# Patient Record
Sex: FEMALE | Race: WHITE | NOT HISPANIC OR LATINO | Employment: UNEMPLOYED | URBAN - METROPOLITAN AREA
[De-identification: names, ages, dates, MRNs, and addresses within clinical notes are randomized per-mention and may not be internally consistent; named-entity substitution may affect disease eponyms.]

---

## 2018-06-26 VITALS
OXYGEN SATURATION: 99 % | BODY MASS INDEX: 26.14 KG/M2 | HEIGHT: 60 IN | HEART RATE: 120 BPM | RESPIRATION RATE: 18 BRPM | WEIGHT: 133.16 LBS | SYSTOLIC BLOOD PRESSURE: 128 MMHG | DIASTOLIC BLOOD PRESSURE: 87 MMHG | TEMPERATURE: 98.4 F

## 2018-06-26 PROCEDURE — 96361 HYDRATE IV INFUSION ADD-ON: CPT

## 2018-06-26 PROCEDURE — 96360 HYDRATION IV INFUSION INIT: CPT

## 2018-06-26 PROCEDURE — 99284 EMERGENCY DEPT VISIT MOD MDM: CPT

## 2018-06-27 ENCOUNTER — HOSPITAL ENCOUNTER (EMERGENCY)
Facility: MEDICAL CENTER | Age: 43
End: 2018-06-27
Attending: EMERGENCY MEDICINE

## 2018-06-27 DIAGNOSIS — R10.2 PELVIC PAIN: ICD-10-CM

## 2018-06-27 LAB
ALBUMIN SERPL BCP-MCNC: 4.1 G/DL (ref 3.2–4.9)
ALBUMIN/GLOB SERPL: 1.2 G/DL
ALP SERPL-CCNC: 79 U/L (ref 30–99)
ALT SERPL-CCNC: 11 U/L (ref 2–50)
ANION GAP SERPL CALC-SCNC: 9 MMOL/L (ref 0–11.9)
APPEARANCE UR: CLEAR
AST SERPL-CCNC: 13 U/L (ref 12–45)
BACTERIA GENITAL QL WET PREP: NORMAL
BASOPHILS # BLD AUTO: 1 % (ref 0–1.8)
BASOPHILS # BLD: 0.08 K/UL (ref 0–0.12)
BILIRUB SERPL-MCNC: 0.4 MG/DL (ref 0.1–1.5)
BILIRUB UR QL STRIP.AUTO: NEGATIVE
BUN SERPL-MCNC: 13 MG/DL (ref 8–22)
C TRACH DNA SPEC QL NAA+PROBE: NEGATIVE
CALCIUM SERPL-MCNC: 8.8 MG/DL (ref 8.5–10.5)
CHLORIDE SERPL-SCNC: 106 MMOL/L (ref 96–112)
CO2 SERPL-SCNC: 24 MMOL/L (ref 20–33)
COLOR UR: YELLOW
CREAT SERPL-MCNC: 0.85 MG/DL (ref 0.5–1.4)
EOSINOPHIL # BLD AUTO: 0.23 K/UL (ref 0–0.51)
EOSINOPHIL NFR BLD: 2.7 % (ref 0–6.9)
ERYTHROCYTE [DISTWIDTH] IN BLOOD BY AUTOMATED COUNT: 46.8 FL (ref 35.9–50)
GLOBULIN SER CALC-MCNC: 3.3 G/DL (ref 1.9–3.5)
GLUCOSE SERPL-MCNC: 110 MG/DL (ref 65–99)
GLUCOSE UR STRIP.AUTO-MCNC: NEGATIVE MG/DL
HCG UR QL: NEGATIVE
HCT VFR BLD AUTO: 45.7 % (ref 37–47)
HGB BLD-MCNC: 15.8 G/DL (ref 12–16)
IMM GRANULOCYTES # BLD AUTO: 0.02 K/UL (ref 0–0.11)
IMM GRANULOCYTES NFR BLD AUTO: 0.2 % (ref 0–0.9)
KETONES UR STRIP.AUTO-MCNC: NEGATIVE MG/DL
LEUKOCYTE ESTERASE UR QL STRIP.AUTO: NEGATIVE
LIPASE SERPL-CCNC: 21 U/L (ref 11–82)
LYMPHOCYTES # BLD AUTO: 4.07 K/UL (ref 1–4.8)
LYMPHOCYTES NFR BLD: 48.6 % (ref 22–41)
MCH RBC QN AUTO: 32.3 PG (ref 27–33)
MCHC RBC AUTO-ENTMCNC: 34.6 G/DL (ref 33.6–35)
MCV RBC AUTO: 93.5 FL (ref 81.4–97.8)
MICRO URNS: NORMAL
MONOCYTES # BLD AUTO: 0.84 K/UL (ref 0–0.85)
MONOCYTES NFR BLD AUTO: 10 % (ref 0–13.4)
N GONORRHOEA DNA SPEC QL NAA+PROBE: NEGATIVE
NEUTROPHILS # BLD AUTO: 3.14 K/UL (ref 2–7.15)
NEUTROPHILS NFR BLD: 37.5 % (ref 44–72)
NITRITE UR QL STRIP.AUTO: NEGATIVE
NRBC # BLD AUTO: 0 K/UL
NRBC BLD-RTO: 0 /100 WBC
PH UR STRIP.AUTO: 5 [PH]
PLATELET # BLD AUTO: 340 K/UL (ref 164–446)
PMV BLD AUTO: 8.6 FL (ref 9–12.9)
POTASSIUM SERPL-SCNC: 3.8 MMOL/L (ref 3.6–5.5)
PROT SERPL-MCNC: 7.4 G/DL (ref 6–8.2)
PROT UR QL STRIP: NEGATIVE MG/DL
RBC # BLD AUTO: 4.89 M/UL (ref 4.2–5.4)
RBC UR QL AUTO: NEGATIVE
SIGNIFICANT IND 70042: NORMAL
SITE SITE: NORMAL
SODIUM SERPL-SCNC: 139 MMOL/L (ref 135–145)
SOURCE SOURCE: NORMAL
SP GR UR REFRACTOMETRY: 1.02
SP GR UR STRIP.AUTO: 1.02
SPECIMEN SOURCE: NORMAL
UROBILINOGEN UR STRIP.AUTO-MCNC: 1 MG/DL
WBC # BLD AUTO: 8.4 K/UL (ref 4.8–10.8)

## 2018-06-27 PROCEDURE — 87591 N.GONORRHOEAE DNA AMP PROB: CPT

## 2018-06-27 PROCEDURE — 85025 COMPLETE CBC W/AUTO DIFF WBC: CPT

## 2018-06-27 PROCEDURE — 83690 ASSAY OF LIPASE: CPT

## 2018-06-27 PROCEDURE — 81025 URINE PREGNANCY TEST: CPT

## 2018-06-27 PROCEDURE — 80053 COMPREHEN METABOLIC PANEL: CPT

## 2018-06-27 PROCEDURE — 700105 HCHG RX REV CODE 258: Performed by: EMERGENCY MEDICINE

## 2018-06-27 PROCEDURE — 87491 CHLMYD TRACH DNA AMP PROBE: CPT

## 2018-06-27 PROCEDURE — 81003 URINALYSIS AUTO W/O SCOPE: CPT

## 2018-06-27 RX ORDER — SODIUM CHLORIDE 9 MG/ML
1000 INJECTION, SOLUTION INTRAVENOUS ONCE
Status: COMPLETED | OUTPATIENT
Start: 2018-06-27 | End: 2018-06-27

## 2018-06-27 RX ADMIN — SODIUM CHLORIDE 1000 ML: 9 INJECTION, SOLUTION INTRAVENOUS at 02:30

## 2018-06-27 NOTE — ED PROVIDER NOTES
ED Provider Note  Chief Complaint:   Abdominal Pain    HPI:  Jose Nunez is a 43 y.o. female who presents with chief complaint of abdominal pain. Pain has been present for the past two weeks, described as intermittent cramping and sharp pain across the lower abdomen and pelvis.  She has noticed associated dyspareunia.  States she has previously had menstrual cramps, however her symptoms today seem more severe.  Additionally, she reports a new sexual partner and is concerned about sexually transmitted infections.  She has no abnormal vaginal bleeding, no abnormal vaginal discharge.  She recently moved to the area and is not currently established with a gynecologist.  She has no associated nausea, no vomiting.  She has not had any recent fevers.  Pain is described as localized across the entire lower abdomen, and is nonlateralizing.  She does have a prior history of appendectomy.  She has taken ibuprofen without alleviation.    On review of systems she has no headaches, no neck or back pain.  She has no chest pain, no shortness of breath.  No extremity pain or swelling.  She has no cough, no congestion, no nasal discharge.  She has no sore throat.  No history of impaired immunity, does use methamphetamine, with recent use in the last 24 hours.    Review of Systems:  See HPI for pertinent positives and negatives. All other systems negative.    Past Medical History:   has a past medical history of Uterine prolapse.    Social History:  Social History     Social History Main Topics   • Smoking status: Current Every Day Smoker     Packs/day: 0.50     Types: Cigarettes   • Smokeless tobacco: Never Used   • Alcohol use No   • Drug use: Yes      Comment: meth occasionally    • Sexual activity: Not on file       Surgical History:   has a past surgical history that includes tubal ligation.    Current Medications:  Home Medications    **Home medications have not yet been reviewed for this encounter**         Allergies:  No  Known Allergies    Physical Exam:  Vital Signs: /87   Pulse (!) 120   Temp 36.9 °C (98.4 °F)   Resp 18   Ht 1.524 m (5')   Wt 60.4 kg (133 lb 2.5 oz)   SpO2 99%   BMI 26.01 kg/m²   Constitutional: Alert, no acute distress  HENT: Moist mucus membranes, normal posterior pharynx, no intraoral lesions  Eyes: Pupils equal and reactive, normal conjunctiva  Neck: Supple, normal range of motion, no stridor  Cardiovascular: Extremities are warm and well perfused, no murmer appreciated, normal cardiac auscultation, tachycardic rate.  Pulmonary: No respiratory distress, normal work of breathing, no accessory muscule usage, breath sounds clear and equal bilaterally  Abdomen: Soft, non-distended, non-tender to palpation, no peritoneal signs, no localizable tenderness to palpation across the lower abdomen.  : Normal external genitalia, cervix normal appearing, scant discharge present consistent with physiologic, no abnormal masses or lesions, no blood in the vaginal vault, no cervical motion tenderness.  Skin: Warm, dry, no rashes or lesions  Musculoskeletal: Normal range of motion in all extremities, no swelling or deformity noted  Neurologic: Alert, oriented, normal speech, normal motor function  Psychiatric: Normal and appropriate mood and affect    No prior medical records available for review.    Labs:  Labs Reviewed   CBC WITH DIFFERENTIAL - Abnormal; Notable for the following:        Result Value    MPV 8.6 (*)     Neutrophils-Polys 37.50 (*)     Lymphocytes 48.60 (*)     All other components within normal limits   COMP METABOLIC PANEL - Abnormal; Notable for the following:     Glucose 110 (*)     All other components within normal limits   LIPASE   URINALYSIS,CULTURE IF INDICATED   HCG QUALITATIVE UR   REFRACTOMETER SG   ESTIMATED GFR   WET PREP   CHLAMYDIA/GC PCR URINE OR SWAB       Radiology:  No orders to display        Differential diagnosis:  Pregnancy, ectopic pregnancy, menstrual cramps, ovarian  cyst, intra-abdominal infection including pyelonephritis, cystitis, tubo-ovarian abscess    ED Medications Administered:  Medications   NS infusion 1,000 mL (0 mL Intravenous Stopped 6/27/18 4223)       MDM:  History and physical exam as documented above.  On arrival to the emergency department patient is afebrile, she has no hypotension, she does have a tachycardic heart rate.  Uncertain as to the cause, this may be due to recent methamphetamine use is reported by the patient.    On laboratory evaluation she has no electrolyte abnormalities.  Lipase is within normal limits, no evidence of pancreatitis.  Urinalysis is negative for evidence of infection, negative for evidence of blood.  White blood count is within normal limits, again was concerning for infection.  Hemoglobin within normal limits.  HCG is negative ruling out pregnancy and pregnancy related complications.  Wet prep is negative for yeast, trichomonas, as well as clue cells.  GC and Chlamydia are pending at this time.    Fluid bolus initiated for tachycardia. On reassessment she appears clinically improved, declined to wait for discharge vital signs.     At this time, I do believe she is stable for discharge home.  Uncertain as to the exact etiology of her pelvic pain, however vaginal exam is reassuring, as is lab work and urinalysis.  Plan at this time is for discharge home, she is referred to gynecology for follow-up.  She is instructed to contact them in the morning.  Return precautions given including recurrent pain or worsening pain, fevers, abnormal bleeding or vaginal discharge, or any further concerns.    Blood pressure today is greater than 120/80, patient is instructed to follow up with primary care provider for blood pressure recheck.    Disposition:  Discharged home in stable condition    Final Impression:  1. Pelvic pain        Electronically signed by: Kaye Chandler, 6/27/2018 10:11 PM

## 2018-06-27 NOTE — ED NOTES
Pt wanted IV out because she had to go to work.  Pt did not want to wait on discharge instructions. Pt left ambulatory from department with steady gait.  Ed phys notified of pt departure.

## 2018-06-27 NOTE — ED TRIAGE NOTES
Olimpiacara Enrique  43 y.o.  /87   Pulse (!) 120   Temp 36.9 °C (98.4 °F)   Resp 18   Ht 1.524 m (5')   Wt 60.4 kg (133 lb 2.5 oz)   SpO2 99%   BMI 26.01 kg/m²   Chief Complaint   Patient presents with   • Abdominal Pain     lower all over abd pains 9/10 started earlier today after sexual intercourse     Pt ambulates for above complaints, states urination urgency is new, brandan fever or flank pains, states abd pains feel like glass all over. VSS, A&Ox4, Pt safe to be returned to lobby, educated on triage process and wait times, instructed to notify any staff of worsening/changing of symptoms.

## 2018-06-27 NOTE — DISCHARGE INSTRUCTIONS
Please follow up with your gynecologist for complete recheck. Return to the emergency department if you develop any new or worsening symptoms including worsening pain, vaginal bleeding soaking more than 2 pads per hour, burning with urination, fevers, or if you have any further concerns.      Pelvic Pain, Female  Pelvic pain is pain felt below the belly button and between your hips. It can be caused by many different things. It is important to get help right away. This is especially true for severe, sharp, or unusual pain that comes on suddenly.   HOME CARE  · Only take medicine as told by your doctor.  · Rest as told by your doctor.  · Eat a healthy diet, such as fruits, vegetables, and lean meats.  · Drink enough fluids to keep your pee (urine) clear or pale yellow, or as told.  · Avoid sex (intercourse) if it causes pain.  · Apply warm or cold packs to your lower belly (abdomen). Use the type of pack that helps the pain.  · Avoid situations that cause you stress.  · Keep a journal to track your pain. Write down:  ¨ When the pain started.  ¨ Where it is located.  ¨ If there are things that seem to be related to the pain, such as food or your period.  · Follow up with your doctor as told.  GET HELP RIGHT AWAY IF:   · You have heavy bleeding from the vagina.  · You have more pelvic pain.  · You feel lightheaded or pass out (faint).  · You have chills.  · You have pain when you pee or have blood in your pee.  · You cannot stop having watery poop (diarrhea).  · You cannot stop throwing up (vomiting).  · You have a fever or lasting symptoms for more than 3 days.  · You have a fever and your symptoms suddenly get worse.  · You are being physically or sexually abused.  · Your medicine does not help your pain.  · You have fluid (discharge) coming from your vagina that is not normal.  MAKE SURE YOU:  · Understand these instructions.  · Will watch your condition.  · Will get help if you are not doing well or get  worse.  This information is not intended to replace advice given to you by your health care provider. Make sure you discuss any questions you have with your health care provider.  Document Released: 06/05/2009 Document Revised: 01/08/2016 Document Reviewed: 10/07/2016  Elsevier Interactive Patient Education © 2017 [employer]

## 2018-10-17 ENCOUNTER — HOSPITAL ENCOUNTER (INPATIENT)
Facility: MEDICAL CENTER | Age: 43
LOS: 1 days | DRG: 520 | End: 2018-10-18
Attending: EMERGENCY MEDICINE | Admitting: INTERNAL MEDICINE

## 2018-10-17 ENCOUNTER — APPOINTMENT (OUTPATIENT)
Dept: RADIOLOGY | Facility: MEDICAL CENTER | Age: 43
DRG: 520 | End: 2018-10-17
Attending: NEUROLOGICAL SURGERY

## 2018-10-17 ENCOUNTER — APPOINTMENT (OUTPATIENT)
Dept: RADIOLOGY | Facility: MEDICAL CENTER | Age: 43
DRG: 520 | End: 2018-10-17
Attending: EMERGENCY MEDICINE

## 2018-10-17 ENCOUNTER — HOSPITAL ENCOUNTER (OUTPATIENT)
Dept: RADIOLOGY | Facility: MEDICAL CENTER | Age: 43
End: 2018-10-17

## 2018-10-17 DIAGNOSIS — M51.26 PROTRUSION OF LUMBAR INTERVERTEBRAL DISC: ICD-10-CM

## 2018-10-17 DIAGNOSIS — M51.26 LUMBAR DISC HERNIATION: ICD-10-CM

## 2018-10-17 PROBLEM — R73.9 HYPERGLYCEMIA: Status: ACTIVE | Noted: 2018-10-17

## 2018-10-17 PROBLEM — D72.829 LEUKOCYTOSIS: Status: ACTIVE | Noted: 2018-10-17

## 2018-10-17 PROBLEM — Z72.0 NICOTINE ABUSE: Status: ACTIVE | Noted: 2018-10-17

## 2018-10-17 LAB
ALBUMIN SERPL BCP-MCNC: 3.7 G/DL (ref 3.2–4.9)
ALBUMIN/GLOB SERPL: 1.3 G/DL
ALP SERPL-CCNC: 75 U/L (ref 30–99)
ALT SERPL-CCNC: 12 U/L (ref 2–50)
ANION GAP SERPL CALC-SCNC: 7 MMOL/L (ref 0–11.9)
APTT PPP: 23 SEC (ref 24.7–36)
AST SERPL-CCNC: 12 U/L (ref 12–45)
BASOPHILS # BLD AUTO: 0.2 % (ref 0–1.8)
BASOPHILS # BLD: 0.03 K/UL (ref 0–0.12)
BILIRUB SERPL-MCNC: 0.2 MG/DL (ref 0.1–1.5)
BUN SERPL-MCNC: 24 MG/DL (ref 8–22)
CALCIUM SERPL-MCNC: 8.9 MG/DL (ref 8.5–10.5)
CHLORIDE SERPL-SCNC: 108 MMOL/L (ref 96–112)
CO2 SERPL-SCNC: 24 MMOL/L (ref 20–33)
CREAT SERPL-MCNC: 0.58 MG/DL (ref 0.5–1.4)
CRP SERPL HS-MCNC: 0.03 MG/DL (ref 0–0.75)
EKG IMPRESSION: NORMAL
EOSINOPHIL # BLD AUTO: 0.02 K/UL (ref 0–0.51)
EOSINOPHIL NFR BLD: 0.2 % (ref 0–6.9)
ERYTHROCYTE [DISTWIDTH] IN BLOOD BY AUTOMATED COUNT: 47.9 FL (ref 35.9–50)
ERYTHROCYTE [SEDIMENTATION RATE] IN BLOOD BY WESTERGREN METHOD: 7 MM/HOUR (ref 0–20)
GLOBULIN SER CALC-MCNC: 2.9 G/DL (ref 1.9–3.5)
GLUCOSE SERPL-MCNC: 131 MG/DL (ref 65–99)
HCG SERPL QL: NEGATIVE
HCT VFR BLD AUTO: 43 % (ref 37–47)
HGB BLD-MCNC: 14.5 G/DL (ref 12–16)
IMM GRANULOCYTES # BLD AUTO: 0.04 K/UL (ref 0–0.11)
IMM GRANULOCYTES NFR BLD AUTO: 0.3 % (ref 0–0.9)
INR PPP: 0.93 (ref 0.87–1.13)
LYMPHOCYTES # BLD AUTO: 0.85 K/UL (ref 1–4.8)
LYMPHOCYTES NFR BLD: 7 % (ref 22–41)
MCH RBC QN AUTO: 32.4 PG (ref 27–33)
MCHC RBC AUTO-ENTMCNC: 33.7 G/DL (ref 33.6–35)
MCV RBC AUTO: 96.2 FL (ref 81.4–97.8)
MONOCYTES # BLD AUTO: 0.09 K/UL (ref 0–0.85)
MONOCYTES NFR BLD AUTO: 0.7 % (ref 0–13.4)
NEUTROPHILS # BLD AUTO: 11.12 K/UL (ref 2–7.15)
NEUTROPHILS NFR BLD: 91.6 % (ref 44–72)
NRBC # BLD AUTO: 0 K/UL
NRBC BLD-RTO: 0 /100 WBC
PLATELET # BLD AUTO: 363 K/UL (ref 164–446)
PMV BLD AUTO: 8.5 FL (ref 9–12.9)
POTASSIUM SERPL-SCNC: 4.3 MMOL/L (ref 3.6–5.5)
PROT SERPL-MCNC: 6.6 G/DL (ref 6–8.2)
PROTHROMBIN TIME: 12.6 SEC (ref 12–14.6)
RBC # BLD AUTO: 4.47 M/UL (ref 4.2–5.4)
SODIUM SERPL-SCNC: 139 MMOL/L (ref 135–145)
WBC # BLD AUTO: 12.2 K/UL (ref 4.8–10.8)

## 2018-10-17 PROCEDURE — 160035 HCHG PACU - 1ST 60 MINS PHASE I: Performed by: NEUROLOGICAL SURGERY

## 2018-10-17 PROCEDURE — 500367 HCHG DRAIN KIT, HEMOVAC: Performed by: NEUROLOGICAL SURGERY

## 2018-10-17 PROCEDURE — A9270 NON-COVERED ITEM OR SERVICE: HCPCS | Performed by: ANESTHESIOLOGY

## 2018-10-17 PROCEDURE — 160036 HCHG PACU - EA ADDL 30 MINS PHASE I: Performed by: NEUROLOGICAL SURGERY

## 2018-10-17 PROCEDURE — 160041 HCHG SURGERY MINUTES - EA ADDL 1 MIN LEVEL 4: Performed by: NEUROLOGICAL SURGERY

## 2018-10-17 PROCEDURE — 85610 PROTHROMBIN TIME: CPT

## 2018-10-17 PROCEDURE — 93005 ELECTROCARDIOGRAM TRACING: CPT | Performed by: EMERGENCY MEDICINE

## 2018-10-17 PROCEDURE — 700101 HCHG RX REV CODE 250

## 2018-10-17 PROCEDURE — 700102 HCHG RX REV CODE 250 W/ 637 OVERRIDE(OP): Performed by: INTERNAL MEDICINE

## 2018-10-17 PROCEDURE — 500885 HCHG PACK, JACKSON TABLE: Performed by: NEUROLOGICAL SURGERY

## 2018-10-17 PROCEDURE — 99291 CRITICAL CARE FIRST HOUR: CPT

## 2018-10-17 PROCEDURE — 99223 1ST HOSP IP/OBS HIGH 75: CPT | Mod: 25 | Performed by: INTERNAL MEDICINE

## 2018-10-17 PROCEDURE — 71045 X-RAY EXAM CHEST 1 VIEW: CPT

## 2018-10-17 PROCEDURE — 160002 HCHG RECOVERY MINUTES (STAT): Performed by: NEUROLOGICAL SURGERY

## 2018-10-17 PROCEDURE — 85025 COMPLETE CBC W/AUTO DIFF WBC: CPT

## 2018-10-17 PROCEDURE — 85730 THROMBOPLASTIN TIME PARTIAL: CPT

## 2018-10-17 PROCEDURE — 160009 HCHG ANES TIME/MIN: Performed by: NEUROLOGICAL SURGERY

## 2018-10-17 PROCEDURE — 700111 HCHG RX REV CODE 636 W/ 250 OVERRIDE (IP)

## 2018-10-17 PROCEDURE — 96376 TX/PRO/DX INJ SAME DRUG ADON: CPT

## 2018-10-17 PROCEDURE — 501838 HCHG SUTURE GENERAL: Performed by: NEUROLOGICAL SURGERY

## 2018-10-17 PROCEDURE — 110454 HCHG SHELL REV 250: Performed by: NEUROLOGICAL SURGERY

## 2018-10-17 PROCEDURE — 0SB20ZZ EXCISION OF LUMBAR VERTEBRAL DISC, OPEN APPROACH: ICD-10-PCS | Performed by: NEUROLOGICAL SURGERY

## 2018-10-17 PROCEDURE — 700111 HCHG RX REV CODE 636 W/ 250 OVERRIDE (IP): Performed by: ANESTHESIOLOGY

## 2018-10-17 PROCEDURE — 500864 HCHG NEEDLE, SPINAL 18G: Performed by: NEUROLOGICAL SURGERY

## 2018-10-17 PROCEDURE — 700102 HCHG RX REV CODE 250 W/ 637 OVERRIDE(OP): Performed by: ANESTHESIOLOGY

## 2018-10-17 PROCEDURE — 500444 HCHG HEMOSTAT, SURGICEL 2X3: Performed by: NEUROLOGICAL SURGERY

## 2018-10-17 PROCEDURE — 85652 RBC SED RATE AUTOMATED: CPT

## 2018-10-17 PROCEDURE — 500331 HCHG COTTONOID, SURG PATTIE: Performed by: NEUROLOGICAL SURGERY

## 2018-10-17 PROCEDURE — 36415 COLL VENOUS BLD VENIPUNCTURE: CPT

## 2018-10-17 PROCEDURE — 700105 HCHG RX REV CODE 258: Performed by: INTERNAL MEDICINE

## 2018-10-17 PROCEDURE — 96375 TX/PRO/DX INJ NEW DRUG ADDON: CPT

## 2018-10-17 PROCEDURE — 00NY0ZZ RELEASE LUMBAR SPINAL CORD, OPEN APPROACH: ICD-10-PCS | Performed by: NEUROLOGICAL SURGERY

## 2018-10-17 PROCEDURE — 700111 HCHG RX REV CODE 636 W/ 250 OVERRIDE (IP): Performed by: INTERNAL MEDICINE

## 2018-10-17 PROCEDURE — 84703 CHORIONIC GONADOTROPIN ASSAY: CPT

## 2018-10-17 PROCEDURE — 80053 COMPREHEN METABOLIC PANEL: CPT

## 2018-10-17 PROCEDURE — 96374 THER/PROPH/DIAG INJ IV PUSH: CPT

## 2018-10-17 PROCEDURE — A9270 NON-COVERED ITEM OR SERVICE: HCPCS | Performed by: INTERNAL MEDICINE

## 2018-10-17 PROCEDURE — 770001 HCHG ROOM/CARE - MED/SURG/GYN PRIV*

## 2018-10-17 PROCEDURE — 86140 C-REACTIVE PROTEIN: CPT

## 2018-10-17 PROCEDURE — 72020 X-RAY EXAM OF SPINE 1 VIEW: CPT

## 2018-10-17 PROCEDURE — 700111 HCHG RX REV CODE 636 W/ 250 OVERRIDE (IP): Performed by: EMERGENCY MEDICINE

## 2018-10-17 PROCEDURE — 160048 HCHG OR STATISTICAL LEVEL 1-5: Performed by: NEUROLOGICAL SURGERY

## 2018-10-17 PROCEDURE — 160029 HCHG SURGERY MINUTES - 1ST 30 MINS LEVEL 4: Performed by: NEUROLOGICAL SURGERY

## 2018-10-17 PROCEDURE — 99406 BEHAV CHNG SMOKING 3-10 MIN: CPT | Performed by: INTERNAL MEDICINE

## 2018-10-17 RX ORDER — HYDROMORPHONE HYDROCHLORIDE 2 MG/ML
0.4 INJECTION, SOLUTION INTRAMUSCULAR; INTRAVENOUS; SUBCUTANEOUS
Status: DISCONTINUED | OUTPATIENT
Start: 2018-10-17 | End: 2018-10-17 | Stop reason: HOSPADM

## 2018-10-17 RX ORDER — ONDANSETRON 4 MG/1
4 TABLET, ORALLY DISINTEGRATING ORAL EVERY 4 HOURS PRN
Status: DISCONTINUED | OUTPATIENT
Start: 2018-10-17 | End: 2018-10-18 | Stop reason: HOSPADM

## 2018-10-17 RX ORDER — PROMETHAZINE HYDROCHLORIDE 25 MG/1
12.5-25 SUPPOSITORY RECTAL EVERY 4 HOURS PRN
Status: DISCONTINUED | OUTPATIENT
Start: 2018-10-17 | End: 2018-10-18 | Stop reason: HOSPADM

## 2018-10-17 RX ORDER — OXYCODONE HCL 5 MG/5 ML
10 SOLUTION, ORAL ORAL
Status: COMPLETED | OUTPATIENT
Start: 2018-10-17 | End: 2018-10-17

## 2018-10-17 RX ORDER — HYDROCODONE BITARTRATE AND ACETAMINOPHEN 5; 325 MG/1; MG/1
1 TABLET ORAL EVERY 4 HOURS PRN
Status: ON HOLD | COMMUNITY
End: 2018-10-18

## 2018-10-17 RX ORDER — DEXAMETHASONE SODIUM PHOSPHATE 4 MG/ML
4 INJECTION, SOLUTION INTRA-ARTICULAR; INTRALESIONAL; INTRAMUSCULAR; INTRAVENOUS; SOFT TISSUE EVERY 6 HOURS
Status: DISCONTINUED | OUTPATIENT
Start: 2018-10-17 | End: 2018-10-18 | Stop reason: HOSPADM

## 2018-10-17 RX ORDER — IBUPROFEN 800 MG/1
800 TABLET ORAL EVERY 8 HOURS PRN
COMMUNITY

## 2018-10-17 RX ORDER — MIDAZOLAM HYDROCHLORIDE 1 MG/ML
1 INJECTION INTRAMUSCULAR; INTRAVENOUS
Status: DISCONTINUED | OUTPATIENT
Start: 2018-10-17 | End: 2018-10-17 | Stop reason: HOSPADM

## 2018-10-17 RX ORDER — ONDANSETRON 2 MG/ML
4 INJECTION INTRAMUSCULAR; INTRAVENOUS
Status: DISCONTINUED | OUTPATIENT
Start: 2018-10-17 | End: 2018-10-17 | Stop reason: HOSPADM

## 2018-10-17 RX ORDER — SODIUM CHLORIDE 9 MG/ML
INJECTION, SOLUTION INTRAVENOUS CONTINUOUS
Status: DISCONTINUED | OUTPATIENT
Start: 2018-10-17 | End: 2018-10-17

## 2018-10-17 RX ORDER — DIPHENHYDRAMINE HYDROCHLORIDE 50 MG/ML
12.5 INJECTION INTRAMUSCULAR; INTRAVENOUS
Status: DISCONTINUED | OUTPATIENT
Start: 2018-10-17 | End: 2018-10-17 | Stop reason: HOSPADM

## 2018-10-17 RX ORDER — OXYCODONE HYDROCHLORIDE 5 MG/1
10 TABLET ORAL
Status: DISCONTINUED | OUTPATIENT
Start: 2018-10-17 | End: 2018-10-17 | Stop reason: HOSPADM

## 2018-10-17 RX ORDER — SODIUM CHLORIDE 9 MG/ML
INJECTION, SOLUTION INTRAVENOUS CONTINUOUS
Status: DISCONTINUED | OUTPATIENT
Start: 2018-10-17 | End: 2018-10-18 | Stop reason: HOSPADM

## 2018-10-17 RX ORDER — HYDROMORPHONE HYDROCHLORIDE 2 MG/ML
0.2 INJECTION, SOLUTION INTRAMUSCULAR; INTRAVENOUS; SUBCUTANEOUS
Status: DISCONTINUED | OUTPATIENT
Start: 2018-10-17 | End: 2018-10-17 | Stop reason: HOSPADM

## 2018-10-17 RX ORDER — BACITRACIN 50000 [IU]/1
INJECTION, POWDER, FOR SOLUTION INTRAMUSCULAR
Status: DISCONTINUED | OUTPATIENT
Start: 2018-10-17 | End: 2018-10-17 | Stop reason: HOSPADM

## 2018-10-17 RX ORDER — HALOPERIDOL 5 MG/ML
1 INJECTION INTRAMUSCULAR
Status: DISCONTINUED | OUTPATIENT
Start: 2018-10-17 | End: 2018-10-17 | Stop reason: HOSPADM

## 2018-10-17 RX ORDER — PROMETHAZINE HYDROCHLORIDE 25 MG/1
12.5-25 TABLET ORAL EVERY 4 HOURS PRN
Status: DISCONTINUED | OUTPATIENT
Start: 2018-10-17 | End: 2018-10-18 | Stop reason: HOSPADM

## 2018-10-17 RX ORDER — SODIUM CHLORIDE, SODIUM LACTATE, POTASSIUM CHLORIDE, AND CALCIUM CHLORIDE .6; .31; .03; .02 G/100ML; G/100ML; G/100ML; G/100ML
IRRIGANT IRRIGATION
Status: DISCONTINUED | OUTPATIENT
Start: 2018-10-17 | End: 2018-10-17 | Stop reason: HOSPADM

## 2018-10-17 RX ORDER — BISACODYL 10 MG
10 SUPPOSITORY, RECTAL RECTAL
Status: DISCONTINUED | OUTPATIENT
Start: 2018-10-17 | End: 2018-10-18 | Stop reason: HOSPADM

## 2018-10-17 RX ORDER — ACETAMINOPHEN 325 MG/1
650 TABLET ORAL EVERY 6 HOURS PRN
Status: DISCONTINUED | OUTPATIENT
Start: 2018-10-17 | End: 2018-10-18 | Stop reason: HOSPADM

## 2018-10-17 RX ORDER — MEPERIDINE HYDROCHLORIDE 25 MG/ML
12.5 INJECTION INTRAMUSCULAR; INTRAVENOUS; SUBCUTANEOUS
Status: DISCONTINUED | OUTPATIENT
Start: 2018-10-17 | End: 2018-10-17 | Stop reason: HOSPADM

## 2018-10-17 RX ORDER — POLYETHYLENE GLYCOL 3350 17 G/17G
1 POWDER, FOR SOLUTION ORAL
Status: DISCONTINUED | OUTPATIENT
Start: 2018-10-17 | End: 2018-10-18 | Stop reason: HOSPADM

## 2018-10-17 RX ORDER — OXYCODONE HYDROCHLORIDE 5 MG/1
5 TABLET ORAL EVERY 4 HOURS PRN
Status: DISCONTINUED | OUTPATIENT
Start: 2018-10-17 | End: 2018-10-18 | Stop reason: HOSPADM

## 2018-10-17 RX ORDER — BUPIVACAINE HYDROCHLORIDE AND EPINEPHRINE 5; 5 MG/ML; UG/ML
INJECTION, SOLUTION EPIDURAL; INTRACAUDAL; PERINEURAL
Status: DISCONTINUED | OUTPATIENT
Start: 2018-10-17 | End: 2018-10-17 | Stop reason: HOSPADM

## 2018-10-17 RX ORDER — HYDROMORPHONE HYDROCHLORIDE 2 MG/ML
0.1 INJECTION, SOLUTION INTRAMUSCULAR; INTRAVENOUS; SUBCUTANEOUS
Status: DISCONTINUED | OUTPATIENT
Start: 2018-10-17 | End: 2018-10-17 | Stop reason: HOSPADM

## 2018-10-17 RX ORDER — OXYCODONE HCL 5 MG/5 ML
5 SOLUTION, ORAL ORAL
Status: COMPLETED | OUTPATIENT
Start: 2018-10-17 | End: 2018-10-17

## 2018-10-17 RX ORDER — MORPHINE SULFATE 4 MG/ML
2 INJECTION, SOLUTION INTRAMUSCULAR; INTRAVENOUS EVERY 4 HOURS PRN
Status: DISCONTINUED | OUTPATIENT
Start: 2018-10-17 | End: 2018-10-18 | Stop reason: HOSPADM

## 2018-10-17 RX ORDER — ONDANSETRON 2 MG/ML
4 INJECTION INTRAMUSCULAR; INTRAVENOUS EVERY 4 HOURS PRN
Status: DISCONTINUED | OUTPATIENT
Start: 2018-10-17 | End: 2018-10-18 | Stop reason: HOSPADM

## 2018-10-17 RX ORDER — AMOXICILLIN 250 MG
2 CAPSULE ORAL 2 TIMES DAILY
Status: DISCONTINUED | OUTPATIENT
Start: 2018-10-17 | End: 2018-10-18 | Stop reason: HOSPADM

## 2018-10-17 RX ORDER — OXYCODONE HYDROCHLORIDE 5 MG/1
5 TABLET ORAL
Status: DISCONTINUED | OUTPATIENT
Start: 2018-10-17 | End: 2018-10-17 | Stop reason: HOSPADM

## 2018-10-17 RX ORDER — MORPHINE SULFATE 4 MG/ML
4 INJECTION, SOLUTION INTRAMUSCULAR; INTRAVENOUS
Status: DISCONTINUED | OUTPATIENT
Start: 2018-10-17 | End: 2018-10-17

## 2018-10-17 RX ADMIN — FENTANYL CITRATE 25 MCG: 50 INJECTION, SOLUTION INTRAMUSCULAR; INTRAVENOUS at 18:05

## 2018-10-17 RX ADMIN — SODIUM CHLORIDE: 9 INJECTION, SOLUTION INTRAVENOUS at 08:15

## 2018-10-17 RX ADMIN — SODIUM CHLORIDE: 9 INJECTION, SOLUTION INTRAVENOUS at 20:57

## 2018-10-17 RX ADMIN — OXYCODONE HYDROCHLORIDE 10 MG: 5 SOLUTION ORAL at 17:51

## 2018-10-17 RX ADMIN — DEXAMETHASONE SODIUM PHOSPHATE 4 MG: 4 INJECTION, SOLUTION INTRAMUSCULAR; INTRAVENOUS at 22:02

## 2018-10-17 RX ADMIN — OXYCODONE HYDROCHLORIDE 5 MG: 5 TABLET ORAL at 22:02

## 2018-10-17 RX ADMIN — MORPHINE SULFATE 4 MG: 4 INJECTION INTRAVENOUS at 07:27

## 2018-10-17 RX ADMIN — DEXAMETHASONE SODIUM PHOSPHATE 4 MG: 4 INJECTION, SOLUTION INTRAMUSCULAR; INTRAVENOUS at 08:45

## 2018-10-17 RX ADMIN — FENTANYL CITRATE 50 MCG: 50 INJECTION, SOLUTION INTRAMUSCULAR; INTRAVENOUS at 17:36

## 2018-10-17 RX ADMIN — MORPHINE SULFATE 2 MG: 4 INJECTION INTRAVENOUS at 23:40

## 2018-10-17 RX ADMIN — MORPHINE SULFATE 2 MG: 4 INJECTION INTRAVENOUS at 13:49

## 2018-10-17 ASSESSMENT — PAIN SCALES - GENERAL
PAINLEVEL_OUTOF10: 2
PAINLEVEL_OUTOF10: 0
PAINLEVEL_OUTOF10: 0
PAINLEVEL_OUTOF10: 1
PAINLEVEL_OUTOF10: 9
PAINLEVEL_OUTOF10: 1
PAINLEVEL_OUTOF10: 6
PAINLEVEL_OUTOF10: 4
PAINLEVEL_OUTOF10: 7
PAINLEVEL_OUTOF10: 6
PAINLEVEL_OUTOF10: 2
PAINLEVEL_OUTOF10: 2

## 2018-10-17 ASSESSMENT — ENCOUNTER SYMPTOMS
PALPITATIONS: 0
HEADACHES: 0
SHORTNESS OF BREATH: 0
NAUSEA: 0
BACK PAIN: 1
SENSORY CHANGE: 1
CHILLS: 0
FOCAL WEAKNESS: 1
DEPRESSION: 0
DIZZINESS: 0
EYE DISCHARGE: 0
MYALGIAS: 0
COUGH: 0
WEIGHT LOSS: 0
NERVOUS/ANXIOUS: 0
STRIDOR: 0
SPUTUM PRODUCTION: 0
VOMITING: 0
SEIZURES: 0
INSOMNIA: 0
DIARRHEA: 0
NECK PAIN: 0
ORTHOPNEA: 0
FEVER: 0
HEARTBURN: 0
ABDOMINAL PAIN: 0
BLURRED VISION: 0
EYE PAIN: 0
EYE REDNESS: 0

## 2018-10-17 ASSESSMENT — PATIENT HEALTH QUESTIONNAIRE - PHQ9
2. FEELING DOWN, DEPRESSED, IRRITABLE, OR HOPELESS: NOT AT ALL
1. LITTLE INTEREST OR PLEASURE IN DOING THINGS: NOT AT ALL
SUM OF ALL RESPONSES TO PHQ9 QUESTIONS 1 AND 2: 0

## 2018-10-17 NOTE — ASSESSMENT & PLAN NOTE
At L4-L5 level  With associated with right lower extremity weakness, tingling numbness sensation and bowel and bladder incontinence  Started on Decadron  N.p.o.  Neurosurgery Dr. nguyen consulted and plan to take her to surgery soon  PT and OT

## 2018-10-17 NOTE — CONSULTS
DATE OF SERVICE:  10/17/2018    ADDENDUM    PHYSICAL EXAMINATION:  GENERAL:  The patient is well developed, well nourished, no apparent distress.  NEUROMUSCULAR:  A 5/5 throughout her lower extremities except for right 3/5   tibialis anterior and EHL, 4- right plantar flexion, 4/5 pain throughout   entire right lower extremity secondary to pain including iliopsoas, hamstring,   quad 4-.  Left lower extremity 5/5 except for 4/5 iliopsoas, quad, hamstring secondary   to pain.  Sensation intact L2-S1, but decreased in L4 distribution including   medial calf as well as in her lateral calf and lateral thigh.  No ankle clonus.  Negative Hodge's.  4- right hip abduction when lying in   bed, gait not tested secondary to pain.  Upper extremity is 5/5 throughout.    Sensation intact C4-T1.       ____________________________________     MALINDA Rowland / CINDY    DD:  10/17/2018 09:10:44  DT:  10/17/2018 12:01:42    D#:  [id number]  Job#:  [id number]

## 2018-10-17 NOTE — ED NOTES
Report received from Jevon BUCHANAN, assumed care of patient.  White board updated, POC discussed.  Call light and belongings within reach.  Bed in lowest position.  Pt resting comfortably on gurney.  Family at bedside.

## 2018-10-17 NOTE — ED TRIAGE NOTES
"Chief Complaint   Patient presents with   • Back Pain     Pt in wheelchair to triage with above complaint. pt states she \"has a herniated disc.\"  Pt sent from [facility]. Pt has transfer papers and disc for ct.      Pt returned to lobby, educated on triage process, and to inform staff of any changes or concerns.    Blood pressure 115/65, pulse (!) 111, temperature 36.8 °C (98.3 °F), temperature source Temporal, resp. rate 20, height 1.524 m (5'), weight 59 kg (130 lb), SpO2 99 %.      "

## 2018-10-17 NOTE — CONSULTS
Neurosurgery Consult Note    Subjective:  Full consult note dictated  Ate at 0500     Exam:  VSS  A&Ox4, NAD  NM: 4/5 hip flexion, knee extension, knee flexion left leg with pain  Right le/5 hip flexion, knee extension with pain, 4- right quad  4/5 plantar flexion, 3/5 dorsiflexion, EHL  4- R Hip ABDuction   Decreased sensation to pinprick and dull stimuli medial and lateral calf, lateral thigh on R leg   + SLR and CSLR at 15 degrees   No clonus, Negative Hodge's     BP  Min: 115/65  Max: 115/65  Pulse  Av  Min: 111  Max: 111  Resp  Av  Min: 20  Max: 20  Temp  Av.8 °C (98.3 °F)  Min: 36.8 °C (98.3 °F)  Max: 36.8 °C (98.3 °F)  SpO2  Av %  Min: 99 %  Max: 99 %    No Data Recorded    Recent Labs      10/17/18   0750   WBC  12.2*   RBC  4.47   HEMOGLOBIN  14.5   HEMATOCRIT  43.0   MCV  96.2   MCH  32.4   MCHC  33.7   RDW  47.9   PLATELETCT  363   MPV  8.5*     Recent Labs      10/17/18   0750   SODIUM  139   POTASSIUM  4.3   CHLORIDE  108   CO2  24   GLUCOSE  131*   BUN  24*   CREATININE  0.58   CALCIUM  8.9     Recent Labs      10/17/18   0750   APTT  23.0*   INR  0.93           Intake/Output     None          No intake or output data in the 24 hours ending 10/17/18 0911         • senna-docusate  2 Tab BID    And   • polyethylene glycol/lytes  1 Packet QDAY PRN    And   • magnesium hydroxide  30 mL QDAY PRN    And   • bisacodyl  10 mg QDAY PRN   • NS   Continuous   • ondansetron  4 mg Q4HRS PRN   • ondansetron  4 mg Q4HRS PRN   • promethazine  12.5-25 mg Q4HRS PRN   • promethazine  12.5-25 mg Q4HRS PRN   • prochlorperazine  5-10 mg Q4HRS PRN   • acetaminophen  650 mg Q6HRS PRN   • morphine injection  2 mg Q4HRS PRN   • oxyCODONE immediate-release  5 mg Q4HRS PRN   • dexamethasone  4 mg Q6HRS       Assessment and Plan:  Hospital day #1  Labs and imaging reviewed  Patient has acute Right foot drop and disc herniation right L4-L5.     Consent ordered for R L4-L5 Microdiscectomy, will coordinate  with OR for time. Risks, benefits, alternatives discussed.   NPO  Ordered SED/CRP, no obvious infection on MRI but WBC minimally elevated in pt with h/o IVDA    Pt agrees with plan, eager to proceed     Hospitalist to admit    D/w Dr. Cid and Dr. San

## 2018-10-17 NOTE — CONSULTS
DATE OF SERVICE:  10/17/2018    NEUROSURGERY CONSULTATION    CHIEF COMPLAINT:  Low back pain and right lower extremity pain, numbness,   weakness.    HISTORY OF PRESENT ILLNESS:  The patient is a 43-year-old female transferred   from outside hospital with a 14-year history of low back pain and left lower   extremity pain.  She notes that over the past 9 days, she started having   increasing pain and weakness in her right lower extremity.  The patient   previously has only had classic L5 radiculopathy on the left lower extremity.    The patient states that she was seen by a surgeon in [place].  She had   previously undergone several epidural steroid injections, physical therapy,   and was set up for a lumbar fusion surgery.  However, ultimately, this was   canceled.  She states that the pain began atraumatically 9 days ago and since   then, her pain has worsened.  She describes numbness in her posterior thigh   and lateral thigh as well as in her posterolateral calf.  The patient   complains of pain in the buttock area as well.  She states she has significant   weakness with walking and excruciating pain with standing and walking.    Factors that alleviate her pain include lying flat and not moving.  She denies   any change in bowel or bladder function.  She denies any saddle anesthesia.    SOCIAL HISTORY:  The patient states she smokes 4 cigarettes a day.  She has a   history of IV drug abuse, noting that she has used heroin 2 weeks ago and has   also used meth previously within the past year.    PAST MEDICAL HISTORY:  She denies any significant past medical history   including any cardiopulmonary problems, any coagulopathy problems, kidney,   liver disease.  She has never had any lumbar or spine surgery previously.    REVIEW OF SYSTEMS:  The patient denies any fevers, chills, night sweats.  She   denies any bowel or bladder incontinence.  She does state that she has had an   issue with urinary leakage and  urgency since the onset of symptoms, but she   denies any karime loss of control of bladder function.  She denies any chest   pain, shortness of breath.  She denies any neck pain or upper extremity pain,   numbness, weakness.  She denies any headaches, changes in vision, or changes   in balance, other than changes in balance secondary to pain in her right leg.    ASSESSMENT AND PLAN:  The patient is a pleasant 43-year-old female with a   longstanding history of low back pain and left lower extremity pain and   history of IV drug abuse.  I reviewed the patient's medical imaging including   lumbar MRI as well as her labs.  Lumbar MRI shows a moderate disk herniation   of right L4-L5.  I do feel the patient is symptomatic from this disk.  On   exam, the patient has significant focal deficits including weakness with right   dorsiflexion, EHL, and hip abduction.  The patient has a sensory deficit in a   L4 and L5 distribution, which is more distal than proximal.    Risk, benefits, and alternatives of surgery were discussed with the patient.    Alternatives discussed include bracing, time, epidural steroid injection, and   physical therapy.  However, the concern would be weakness in her right foot   correlating with L5 radiculopathy.  Risks discussed include but not limited to   death, coma, paralysis, risk of bleeding, anesthesia, recurrent disk   reherniation.  This was discussed in a non-exhaustive list.  The patient is   eager to proceed with surgery.    The patient will be admitted by the hospitalist team.  She will be n.p.o.   until a definite time for surgery is planned.  We are working with the   operating room now to schedule for surgery including a right L4-L5   microdiskectomy.  Consent was ordered.  If the patient is not scheduled for   surgery today, we will allow the patient to eat and have her be n.p.o. after   midnight.  I did review the patient's labs and I will order a sed rate and   CRP.  Otherwise, the  patient is eager to proceed.  All questions were answered   by myself.  The case was discussed with Dr. Cid and Dr. San.       ____________________________________     MALINDA Rowland / CINDY    DD:  10/17/2018 09:06:18  DT:  10/17/2018 11:40:50    D#:  [id number]  Job#:  [id number]

## 2018-10-17 NOTE — H&P
Hospital Medicine History and Physical      Date of Service  10/17/2018    Chief Complaint  Chief Complaint   Patient presents with   • Back Pain       History of Presenting Illness  Enrique is a 43 y.o. female PMH of chronic back pain with herniated disks for the past 14 years, who was transferred from outside facility due to large central disc protrusion at L4-L5 level.  She initially presented to outside facility yesterday since she started having right leg weakness and bowel and bladder incontinence, tingling and numbness sensation of her right leg.  In the ER Dr. nguyen neurosurgery was consulted and plan to take her to OR very soon today.    Primary Care Physician  No primary care provider on file.      Code Status  Full code    Review of Systems  Review of Systems   Constitutional: Negative for chills, fever and weight loss.   HENT: Negative for congestion and nosebleeds.    Eyes: Negative for blurred vision, pain, discharge and redness.   Respiratory: Negative for cough, sputum production, shortness of breath and stridor.    Cardiovascular: Negative for chest pain, palpitations and orthopnea.   Gastrointestinal: Negative for abdominal pain, diarrhea, heartburn, nausea and vomiting.   Genitourinary: Negative for dysuria, frequency and urgency.   Musculoskeletal: Positive for back pain. Negative for myalgias and neck pain.   Skin: Negative for itching and rash.   Neurological: Positive for sensory change and focal weakness. Negative for dizziness, seizures and headaches.        Right lower extremity weakness, tingling numbness sensation   Psychiatric/Behavioral: Negative for depression. The patient is not nervous/anxious and does not have insomnia.      Please see HPI, all other systems were reviewed and are negative (AMA/CMS criteria)     Past Medical History  Past Medical History:   Diagnosis Date   • Uterine prolapse        Surgical History  Past Surgical History:   Procedure Laterality Date   • TUBAL  LIGATION         Medications  Not taking any medication  Family History  History reviewed. No pertinent family history.      Social History  Social History   Substance Use Topics   • Smoking status: Current Every Day Smoker     Packs/day: 0.50     Types: Cigarettes   • Smokeless tobacco: Never Used   • Alcohol use Yes      Comment: occ       Allergies  No Known Allergies     Physical Exam  Laboratory   Hemodynamics  Temp (24hrs), Av.8 °C (98.3 °F), Min:36.8 °C (98.3 °F), Max:36.8 °C (98.3 °F)   Temperature: 36.8 °C (98.3 °F)  Pulse  Av  Min: 111  Max: 111    Blood Pressure: 115/65      Respiratory      Respiration: 20, Pulse Oximetry: 99 %             Physical Exam   Constitutional: She is oriented to person, place, and time. No distress.   HENT:   Head: Normocephalic and atraumatic.   Mouth/Throat: Oropharynx is clear and moist.   Eyes: Pupils are equal, round, and reactive to light. Conjunctivae and EOM are normal.   Neck: Normal range of motion. Neck supple. No tracheal deviation present. No thyromegaly present.   Cardiovascular: Normal rate and regular rhythm.    No murmur heard.  Pulmonary/Chest: Effort normal and breath sounds normal. No respiratory distress. She has no wheezes.   Abdominal: Soft. Bowel sounds are normal. She exhibits no distension. There is no tenderness.   Musculoskeletal: She exhibits no edema or tenderness.   Neurological: She is alert and oriented to person, place, and time. No cranial nerve deficit.   Right lower extremity strength 3 out of 5  Left lower extremity strength 5 out of 5   Skin: Skin is warm and dry. She is not diaphoretic. No erythema.   Psychiatric: She has a normal mood and affect. Her behavior is normal. Thought content normal.               No results for input(s): ALTSGPT, ASTSGOT, ALKPHOSPHAT, TBILIRUBIN, DBILIRUBIN, GAMMAGT, AMYLASE, LIPASE, ALB, PREALBUMIN, GLUCOSE in the last 72 hours.              No results found for:  TROPONINI    Imaging  DX-CHEST-PORTABLE (1 VIEW)   Final Result         1.  No acute cardiopulmonary disease.      OUTSIDE IMAGES-MR LUMBAR SPINE   Final Result             Assessment/Plan     I anticipate this patient will require at least two midnights for appropriate medical management, necessitating inpatient admission.    Protrusion of lumbar intervertebral disc- (present on admission)   Assessment & Plan    At L4-L5 level  With associated with right lower extremity weakness, tingling numbness sensation and bowel and bladder incontinence  Started on Decadron  N.p.o.  Neurosurgery Dr. nguyen consulted and plan to take her to surgery soon  PT and OT        Hyperglycemia- (present on admission)   Assessment & Plan    No history of diabetes        Leukocytosis- (present on admission)   Assessment & Plan    Likely reactive        Nicotine abuse- (present on admission)   Assessment & Plan    Spent 3 minutes on smoking cessation education            Prophylaxis:  sc heparin

## 2018-10-17 NOTE — CONSULTS
DATE OF SERVICE:  10/17/2018    NEUROSURGERY CONSULT NOTE    Dictation ends here.       ____________________________________     MALINDA Rowland / CINDY    DD:  10/17/2018 08:59:11  DT:  10/17/2018 11:01:38    D#:  [id number]  Job#:  [id number]

## 2018-10-17 NOTE — ED TRIAGE NOTES
"Pt to rm 17 per wc, c/o severe pains to lower back and numbness to right leg x 1 day, pt hx 17+ years \"herniated discs\" with multiple episodes of \"severe pains over the years\", aox4, resp even/unlabored, no surgical intervention to date  "

## 2018-10-17 NOTE — PROGRESS NOTES
Called for NS consult on Jose Nunez.    Hx is from Dr. San.    Per Dr. San, she is a 42 yo with h/o IVDA and chronic LBP that is getting worse, ? Surgery planned in [place], and numbness of right leg with some right leg weakness ? Secondary to pain vs real weakness.    MRI lumbar [facility] 10/15/18 has been transferred to [facility] and was reviewed online.    The report was not available.    + Right L4,5 moderate disc herniation.    No obvious evidence of infection.    Per Dr. San, she has a right foot drop and decreased sensation in the right leg.    Patient has a surgical lesion on MRI and a foot drop per ER doc.    Will see patient to discuss risks, benefits, and treatment options and plan right L4,5 microdiscectomy.

## 2018-10-17 NOTE — ED PROVIDER NOTES
ED Provider Note      CHIEF COMPLAINT  Chief Complaint   Patient presents with   • Back Pain       HPI  Jose Nunez is a 43 y.o. female who presents with back pain. Has history of ongoing back pain over the last 14 years. It's been off and on. She has been followed by a neurosurgeon in [place]. There was a plan for her to have a lumbar fusion, but she lost her insurance and subsequently moved to [place]. Her pain came back about 9 days ago. Located lower lumbar spine. Radiates down the right lower extremity with associated numbness. Seen at [facility] yesterday. Had an MRI of the lumbar spine demonstrating herniated disc. Symptoms worsened and developed numbness with weakness in the right leg. Went back to [facility] and was referred here for neurosurgical consultation. She has not picked up the prescriptions she was given. Pain is severe and worse with movement. No numbness or tingling around the genitals or anus. No urinary retention. No fever.    Chart reviewed from [facility]. MRI dated 10/15/2018 demonstrates large central disc protrusion at L4-5. No epidural abscess, discitis, osteomyelitis.  Laboratory data 10/15 WBC count 12.6 otherwise normal CBC, sed rate normal, BMP normal, hCG normal, CRP normal    REVIEW OF SYSTEMS  Denies any weakness in the lower extremities. No bowel or bladder incontinence or saddle anesthesia. No fevers or chills. No injection drug use or IV drug abuse. No abdominal pain, nausea or vomiting. No dysuria, hematuria or flank pain. All other systems reviewed and negative    PAST MEDICAL HISTORY  Past Medical History:   Diagnosis Date   • Uterine prolapse        FAMILY HISTORY  History reviewed. No pertinent family history.    SOCIAL HISTORY  Social History   Substance Use Topics   • Smoking status: Current Every Day Smoker     Packs/day: 0.50     Types: Cigarettes   • Smokeless tobacco: Never Used   • Alcohol use Yes       Comment: occ   Positive injection methamphetamine use a few weeks ago. Smoked methamphetamine last week.    SURGICAL HISTORY  Past Surgical History:   Procedure Laterality Date   • TUBAL LIGATION         CURRENT MEDICATIONS  Home Medications     Reviewed by Micah Pulliam R.N. (Registered Nurse) on 10/17/18 at 0557  Med List Status: Not Addressed   Medication Last Dose Status        Patient John Taking any Medications                       ALLERGIES  No Known Allergies      PHYSICAL EXAM  VITAL SIGNS: /65   Pulse (!) 111   Temp 36.8 °C (98.3 °F) (Temporal)   Resp 20   Ht 1.524 m (5')   Wt 59 kg (130 lb)   LMP  (Within Weeks)   SpO2 99%   BMI 25.39 kg/m²   Constitutional: Well developed, Well nourished, No acute distress, Non-toxic appearance. Complaining of pain  Neck: Grossly normal range of motion  Cardiovascular: Normal heart rate   Thorax & Lungs: No respiratory distress  Abdomen: Bowel sounds normal, soft, non-distended, nontender, no masses.  Skin: Warm, Dry, No rash.   Back: Diffuse lumbar tenderness, no step-off or deformity  Extremities: No clubbing, cyanosis, edema, no Homans or cords   Neurologic: Grossly normal cranial nerves, 3/5 EHL, FHL, gastrocnemius, tibialis anterior on the right with 4/5 strength on the left. Sensation is intact to light touch although describes a sensory disturbance diffusely over the right leg.. Slightly hyperreflexic DTRs at the patellas bilaterally.    RADIOLOGY/PROCEDURES  DX-CHEST-PORTABLE (1 VIEW)   Final Result         1.  No acute cardiopulmonary disease.      OUTSIDE IMAGES-MR LUMBAR SPINE   Final Result         Imaging is interpreted by radiologist     Pertinent Labs:   Results for orders placed or performed during the hospital encounter of 10/17/18   CBC WITH DIFFERENTIAL   Result Value Ref Range    WBC 12.2 (H) 4.8 - 10.8 K/uL    RBC 4.47 4.20 - 5.40 M/uL    Hemoglobin 14.5 12.0 - 16.0 g/dL    Hematocrit 43.0 37.0 - 47.0 %    MCV 96.2 81.4 - 97.8  fL    MCH 32.4 27.0 - 33.0 pg    MCHC 33.7 33.6 - 35.0 g/dL    RDW 47.9 35.9 - 50.0 fL    Platelet Count 363 164 - 446 K/uL    MPV 8.5 (L) 9.0 - 12.9 fL    Neutrophils-Polys 91.60 (H) 44.00 - 72.00 %    Lymphocytes 7.00 (L) 22.00 - 41.00 %    Monocytes 0.70 0.00 - 13.40 %    Eosinophils 0.20 0.00 - 6.90 %    Basophils 0.20 0.00 - 1.80 %    Immature Granulocytes 0.30 0.00 - 0.90 %    Nucleated RBC 0.00 /100 WBC    Neutrophils (Absolute) 11.12 (H) 2.00 - 7.15 K/uL    Lymphs (Absolute) 0.85 (L) 1.00 - 4.80 K/uL    Monos (Absolute) 0.09 0.00 - 0.85 K/uL    Eos (Absolute) 0.02 0.00 - 0.51 K/uL    Baso (Absolute) 0.03 0.00 - 0.12 K/uL    Immature Granulocytes (abs) 0.04 0.00 - 0.11 K/uL    NRBC (Absolute) 0.00 K/uL   EKG (NOW)   Result Value Ref Range    Report       [facility] Emergency Dept.    Test Date:  2018-10-17  Pt Name:    ELIAN NIX               Department: ER  MRN:        [id number]                      Room:       [facility]  Gender:     Female                       Technician: [id number]  :        [id number]                   Requested By:LELA DINERO  Order #:    [id number]                    Reading MD: LELA DINERO MD    Measurements  Intervals                                Axis  Rate:       97                           P:          50  IA:         132                          QRS:        28  QRSD:       74                           T:          30  QT:         348  QTc:        442    Interpretive Statements  SINUS RHYTHM  No previous ECG available for comparison    Electronically Signed On 10- 8:18:50 PDT by LELA DINERO MD          COURSE & MEDICAL DECISION MAKING    Patient presents with severe back pain. Has a disc herniation with weakness most notably in the right lower extremity. Has increased weakness on dorsiflexion of the foot. Reviewed data from outside hospital as noted above area and ordered morphine for analgesia. Paged neurosurgery.    Discussed with Dr. nguyen  who will look at MRI.    Discussed with Dr. nguyen again who plans to come into the ER and evaluate the patient. He requested patient be made nothing by mouth. I ordered IV fluids as the patient cannot take anything orally. She is stable. I ordered preoperative data.    Patient will be admitted to the hospitalist service. Hospitalist was paged for admission.    FINAL IMPRESSION  1. Lumbar disc herniation with myelopathy        This dictation was created using voice recognition software. The accuracy of the dictation is limited to the abilities of the software.  The nursing notes were reviewed and certain aspects of this information were incorporated into this note.    Electronically signed by: Dominic San, 10/17/2018 6:28 AM

## 2018-10-18 VITALS
OXYGEN SATURATION: 95 % | SYSTOLIC BLOOD PRESSURE: 107 MMHG | BODY MASS INDEX: 25.52 KG/M2 | HEART RATE: 93 BPM | TEMPERATURE: 98.7 F | DIASTOLIC BLOOD PRESSURE: 72 MMHG | RESPIRATION RATE: 18 BRPM | HEIGHT: 60 IN | WEIGHT: 130 LBS

## 2018-10-18 LAB
ALBUMIN SERPL BCP-MCNC: 3.3 G/DL (ref 3.2–4.9)
ALBUMIN/GLOB SERPL: 1.3 G/DL
ALP SERPL-CCNC: 62 U/L (ref 30–99)
ALT SERPL-CCNC: 10 U/L (ref 2–50)
ANION GAP SERPL CALC-SCNC: 9 MMOL/L (ref 0–11.9)
AST SERPL-CCNC: 11 U/L (ref 12–45)
BILIRUB SERPL-MCNC: 0.2 MG/DL (ref 0.1–1.5)
BUN SERPL-MCNC: 15 MG/DL (ref 8–22)
CALCIUM SERPL-MCNC: 8.2 MG/DL (ref 8.5–10.5)
CHLORIDE SERPL-SCNC: 107 MMOL/L (ref 96–112)
CO2 SERPL-SCNC: 23 MMOL/L (ref 20–33)
CREAT SERPL-MCNC: 0.63 MG/DL (ref 0.5–1.4)
ERYTHROCYTE [DISTWIDTH] IN BLOOD BY AUTOMATED COUNT: 48.6 FL (ref 35.9–50)
GLOBULIN SER CALC-MCNC: 2.6 G/DL (ref 1.9–3.5)
GLUCOSE SERPL-MCNC: 131 MG/DL (ref 65–99)
HCT VFR BLD AUTO: 40.3 % (ref 37–47)
HGB BLD-MCNC: 13.3 G/DL (ref 12–16)
MCH RBC QN AUTO: 32.3 PG (ref 27–33)
MCHC RBC AUTO-ENTMCNC: 33 G/DL (ref 33.6–35)
MCV RBC AUTO: 97.8 FL (ref 81.4–97.8)
PLATELET # BLD AUTO: 341 K/UL (ref 164–446)
PMV BLD AUTO: 8.9 FL (ref 9–12.9)
POTASSIUM SERPL-SCNC: 4 MMOL/L (ref 3.6–5.5)
PROT SERPL-MCNC: 5.9 G/DL (ref 6–8.2)
RBC # BLD AUTO: 4.12 M/UL (ref 4.2–5.4)
SODIUM SERPL-SCNC: 139 MMOL/L (ref 135–145)
WBC # BLD AUTO: 21.4 K/UL (ref 4.8–10.8)

## 2018-10-18 PROCEDURE — 99239 HOSP IP/OBS DSCHRG MGMT >30: CPT | Performed by: HOSPITALIST

## 2018-10-18 PROCEDURE — 700102 HCHG RX REV CODE 250 W/ 637 OVERRIDE(OP): Performed by: INTERNAL MEDICINE

## 2018-10-18 PROCEDURE — G8979 MOBILITY GOAL STATUS: HCPCS | Mod: CI

## 2018-10-18 PROCEDURE — A9270 NON-COVERED ITEM OR SERVICE: HCPCS | Performed by: INTERNAL MEDICINE

## 2018-10-18 PROCEDURE — 97161 PT EVAL LOW COMPLEX 20 MIN: CPT

## 2018-10-18 PROCEDURE — G8980 MOBILITY D/C STATUS: HCPCS | Mod: CI

## 2018-10-18 PROCEDURE — G8987 SELF CARE CURRENT STATUS: HCPCS | Mod: CI

## 2018-10-18 PROCEDURE — G8978 MOBILITY CURRENT STATUS: HCPCS | Mod: CI

## 2018-10-18 PROCEDURE — 700111 HCHG RX REV CODE 636 W/ 250 OVERRIDE (IP): Performed by: INTERNAL MEDICINE

## 2018-10-18 PROCEDURE — G8989 SELF CARE D/C STATUS: HCPCS | Mod: CI

## 2018-10-18 PROCEDURE — 85027 COMPLETE CBC AUTOMATED: CPT

## 2018-10-18 PROCEDURE — G8988 SELF CARE GOAL STATUS: HCPCS | Mod: CI

## 2018-10-18 PROCEDURE — 36415 COLL VENOUS BLD VENIPUNCTURE: CPT

## 2018-10-18 PROCEDURE — 97165 OT EVAL LOW COMPLEX 30 MIN: CPT

## 2018-10-18 PROCEDURE — 80053 COMPREHEN METABOLIC PANEL: CPT

## 2018-10-18 RX ORDER — OXYCODONE HYDROCHLORIDE 5 MG/1
5 TABLET ORAL EVERY 6 HOURS PRN
Qty: 20 TAB | Refills: 0 | Status: SHIPPED | OUTPATIENT
Start: 2018-10-18 | End: 2018-10-23

## 2018-10-18 RX ORDER — AMOXICILLIN 250 MG
2 CAPSULE ORAL 2 TIMES DAILY
Qty: 30 TAB | Refills: 0 | Status: SHIPPED | OUTPATIENT
Start: 2018-10-18

## 2018-10-18 RX ADMIN — SENNOSIDES AND DOCUSATE SODIUM 2 TABLET: 8.6; 5 TABLET ORAL at 06:07

## 2018-10-18 RX ADMIN — OXYCODONE HYDROCHLORIDE 5 MG: 5 TABLET ORAL at 06:07

## 2018-10-18 RX ADMIN — DEXAMETHASONE SODIUM PHOSPHATE 4 MG: 4 INJECTION, SOLUTION INTRAMUSCULAR; INTRAVENOUS at 06:05

## 2018-10-18 RX ADMIN — OXYCODONE HYDROCHLORIDE 5 MG: 5 TABLET ORAL at 02:10

## 2018-10-18 ASSESSMENT — COGNITIVE AND FUNCTIONAL STATUS - GENERAL
SUGGESTED CMS G CODE MODIFIER MOBILITY: CK
DRESSING REGULAR UPPER BODY CLOTHING: A LITTLE
TOILETING: A LITTLE
TURNING FROM BACK TO SIDE WHILE IN FLAT BAD: A LITTLE
DRESSING REGULAR LOWER BODY CLOTHING: A LITTLE
HELP NEEDED FOR BATHING: A LITTLE
DAILY ACTIVITIY SCORE: 19
DAILY ACTIVITIY SCORE: 23
PERSONAL GROOMING: A LITTLE
MOVING TO AND FROM BED TO CHAIR: A LITTLE
MOVING TO AND FROM BED TO CHAIR: A LITTLE
WALKING IN HOSPITAL ROOM: A LITTLE
SUGGESTED CMS G CODE MODIFIER DAILY ACTIVITY: CI
STANDING UP FROM CHAIR USING ARMS: A LITTLE
SUGGESTED CMS G CODE MODIFIER DAILY ACTIVITY: CK
STANDING UP FROM CHAIR USING ARMS: A LITTLE
MOBILITY SCORE: 19
MOVING FROM LYING ON BACK TO SITTING ON SIDE OF FLAT BED: A LITTLE
MOVING FROM LYING ON BACK TO SITTING ON SIDE OF FLAT BED: A LITTLE
WALKING IN HOSPITAL ROOM: A LITTLE
TURNING FROM BACK TO SIDE WHILE IN FLAT BAD: A LITTLE
DRESSING REGULAR LOWER BODY CLOTHING: A LITTLE

## 2018-10-18 ASSESSMENT — GAIT ASSESSMENTS
GAIT LEVEL OF ASSIST: SUPERVISED
DEVIATION: BRADYKINETIC;SHUFFLED GAIT
DISTANCE (FEET): 125

## 2018-10-18 ASSESSMENT — PATIENT HEALTH QUESTIONNAIRE - PHQ9
1. LITTLE INTEREST OR PLEASURE IN DOING THINGS: NOT AT ALL
2. FEELING DOWN, DEPRESSED, IRRITABLE, OR HOPELESS: NOT AT ALL
SUM OF ALL RESPONSES TO PHQ9 QUESTIONS 1 AND 2: 0

## 2018-10-18 ASSESSMENT — LIFESTYLE VARIABLES
HAVE PEOPLE ANNOYED YOU BY CRITICIZING YOUR DRINKING: NO
AVERAGE NUMBER OF DAYS PER WEEK YOU HAVE A DRINK CONTAINING ALCOHOL: 0
HAVE YOU EVER FELT YOU SHOULD CUT DOWN ON YOUR DRINKING: NO
CONSUMPTION TOTAL: NEGATIVE
ALCOHOL_USE: YES
TOTAL SCORE: 0
EVER_SMOKED: YES
EVER FELT BAD OR GUILTY ABOUT YOUR DRINKING: NO
TOTAL SCORE: 0
ON A TYPICAL DAY WHEN YOU DRINK ALCOHOL HOW MANY DRINKS DO YOU HAVE: 0
EVER HAD A DRINK FIRST THING IN THE MORNING TO STEADY YOUR NERVES TO GET RID OF A HANGOVER: NO
HOW MANY TIMES IN THE PAST YEAR HAVE YOU HAD 5 OR MORE DRINKS IN A DAY: 0
TOTAL SCORE: 0

## 2018-10-18 ASSESSMENT — COPD QUESTIONNAIRES
DO YOU EVER COUGH UP ANY MUCUS OR PHLEGM?: NO/ONLY WITH OCCASIONAL COLDS OR INFECTIONS
IN THE PAST 12 MONTHS DO YOU DO LESS THAN YOU USED TO BECAUSE OF YOUR BREATHING PROBLEMS: DISAGREE/UNSURE
DURING THE PAST 4 WEEKS HOW MUCH DID YOU FEEL SHORT OF BREATH: NONE/LITTLE OF THE TIME

## 2018-10-18 ASSESSMENT — ACTIVITIES OF DAILY LIVING (ADL): TOILETING: INDEPENDENT

## 2018-10-18 ASSESSMENT — PAIN SCALES - GENERAL: PAINLEVEL_OUTOF10: 4

## 2018-10-18 NOTE — OP REPORT
DATE OF SERVICE:  10/17/2018    PREOPERATIVE DIAGNOSIS:   Right L4-L5 disk herniation with right foot drop and   right leg numbness.    POSTOPERATIVE DIAGNOSIS:  Right L4-L5 disk herniation with right foot drop and   right leg numbness.    PRINCIPAL PROCEDURE PERFORMED:  Emergency right L4-L5 microdiskectomy.    SURGEON:  Jevon Cid MD    ANESTHESIA:  Procedure was performed under general anesthesia.    ANESTHESIOLOGIST:  Rambo Sears MD    COMPLICATIONS:  There were no complications.    FINDINGS:  Include a free fragment with compression of the right L4-L5 nerve   root.  The free fragment was partially calcified.  No complications.    DISPOSITION:  Patient will be extubated and brought to the recovery room.    CLINICAL HISTORY:  The patient is a 43-year-old female who presents with   severe excruciating right leg pain.  She has a history of chronic low back   pain.  She has a history of IV drug abuse.  It appeared that she was   complaining of symptoms totally consistent with a large right L4-L5 disk   herniation.  She went to the emergency room in [facility] a couple of   days ago.  MRI there showed a large right L4-L5 disk herniation.  The patient   was recommended for outpatient followup.  She represents to the [facility]   Emergency Room today with severe excruciating symptomatology and a foot drop   consistent with her MRI.  Surgery was recommended.  Other options such as   doing nothing versus nonsurgical treatment were discussed.  The patient opted   for surgery.  Her reply when asked about surgery was oh yes.  Risks were   discussed as including, but not limited to infection, bleeding, CSF leak,   death, coma, paralysis, reoperation, recurrent disk herniation, potential   instability, potential need for fusion at some point.  We also discussed that   I would write pain medications for 2 weeks after surgery and that was set.    The patient agreed.    DESCRIPTION OF PROCEDURE:  The patient was  brought to the operating room and   placed under general anesthesia.  She was turned prone atop of a radiolucent   OSI table.  Her back was identified and a time-out was performed after the   area was prepped and draped.  Local anesthetic was infiltrated in the skin.  A   midline skin incision was made over L4-L5.  The thoracolumbar fascia was   incised.  Intraoperative fluoroscopy was used to demonstrate the correct   level.  A self-retaining retractor was placed.  I used an AMA drill bit to   create a hemilaminectomy on the right at L4-L5.  The ligamentum flavum was   detached by using an upbiting curette.  I used a Kerrison 2 and Kerrison 3   punch to complete a hemilaminectomy, medial facetectomy and foraminotomy.  The   L5 nerve root was gently retracted medial to the L5 pedicle and multiple   fragments of disk herniation, which were partially calcified were removed.  I   pushed down by using a downbiting curette to confirm that there was no   residual disk herniation.  I probed with a ball tip probe as well and a Mcghee   ball to confirm that the thecal sac anteriorly, laterally, medially,   superiorly and inferiorly was completely decompressed.  Perfect hemostasis was   achieved.  The wound was closed in anatomic layers and a sterile dressing was   applied.       ____________________________________     MD REBECA MENDOZA / CINDY    DD:  10/17/2018 17:06:57  DT:  10/17/2018 18:23:54    D#:  [id number]  Job#:  [id number]

## 2018-10-18 NOTE — OR SURGEON
Immediate Post OP Note    PreOp Diagnosis: right L4,5 disc herniation, right foot drop, right leg numbness    PostOp Diagnosis: same    Procedure(s):  LUMBAR LAMINECTOMY DISKECTOMY-MICRO DISC 4-5 - Wound Class: Clean with Drain    Surgeon(s):  Jevon Cid M.D.    Anesthesiologist/Type of Anesthesia:  Anesthesiologist: Rambo Sears M.D./General    Surgical Staff:  Circulator: Eileen Gallegos R.N.  Scrub Person: Selene Crain  Radiology Technologist: Stormy Lara    Specimens removed if any:  * No specimens in log *    Estimated Blood Loss: minimal    Findings: + free fragment, severe compression of the right L4,5 nerve root    Complications: none        10/17/2018 5:00 PM Jevon Cid M.D.

## 2018-10-18 NOTE — PROGRESS NOTES
Pt arrived on unit via stretcher, ambulated from stretcher to bed in room, iv fluids infusing, scds in place, states pain 4/10 but had recently been medicated, a&ox4, denies n/v/sob, o2 via nc, pt stated feeling anxious and wanted to smoke, pt educated on facility smoking policies, was offered to try to get order for nicotine patch or gum but pt refused.  Boyfriend at bedside, oriented to room, bed low/locked with upper side rails in place, call light and personal belongings in reach, reminded to call for assistance and of purposeful rounding.

## 2018-10-18 NOTE — THERAPY
"Occupational Therapy Evaluation completed.   Functional Status: Up in room w/PT, spv w/all ADL's, did have c/o pain w/activity. Demonstrated ability to complete ADL's while applying spinal precautions. Demo'd AE (sock aid and reacher) BTB post session. RN aware  Plan of Care: Patient with no further skilled OT needs in the acute care setting at this time  Discharge Recommendations:  Equipment: No Equipment Needed. Post-acute therapy Anticipate that the patient will have no further occupational therapy needs after discharge from the hospital.     See \"Rehab Therapy-Acute\" Patient Summary Report for complete documentation.    "

## 2018-10-18 NOTE — DISCHARGE SUMMARY
Discharge Summary    CHIEF COMPLAINT ON ADMISSION  Chief Complaint   Patient presents with   • Back Pain       Reason for Admission  Sent by MD     Admission Date  10/17/2018    CODE STATUS  Full Code    HPI & HOSPITAL COURSE  This is a 43 y.o. female who was admitted with acute on chronic back pain with acute right lower extremity leg weakness, numbness and foot drop.  She was found to have a large central disc protrusion at L4-L5 and underwent a lumbar laminectomy diskectomy with Dr. Cid on 10/17/18.  She tolerated the surgery well and her foot drop is resolving along with the weakness and numbness.  She did well with physical therapy and has declined home health.  She will follow up with neurosurgery as an outpatient.  She is a chronic smoker and nicotine cessation was recommended and discussed.  She has leukocytosis post operatively which is likely due to a stress reaction, she has no clinical signs of infection and will have this followed by neurosurgery as an outpatient, she agrees to return to er if any signs or symptoms of infection.     Therefore, she is discharged in fair and stable condition to home with close outpatient follow-up.    The patient recovered much more quickly than anticipated on admission.    Discharge Date  10/18/18    FOLLOW UP ITEMS POST DISCHARGE  neurosurgery    DISCHARGE DIAGNOSES  Active Problems:    Protrusion of lumbar intervertebral disc POA: Yes    Nicotine abuse POA: Yes    Leukocytosis POA: Yes    Hyperglycemia POA: Yes  Resolved Problems:    * No resolved hospital problems. *      FOLLOW UP  No future appointments.  Jevon Cid M.D.  [id number] [facility] [address] [address]  [phone]    Schedule an appointment as soon as possible for a visit in 2 weeks        MEDICATIONS ON DISCHARGE     Medication List      START taking these medications      Instructions   oxyCODONE immediate-release 5 MG Tabs  Commonly known as:  ROXICODONE   Take 1 Tab by mouth every 6  hours as needed for up to 20 doses.  Dose:  5 mg     senna-docusate 8.6-50 MG Tabs  Commonly known as:  PERICOLACE or SENOKOT S   Take 2 Tabs by mouth 2 Times a Day.  Dose:  2 Tab        CONTINUE taking these medications      Instructions   ibuprofen 800 MG Tabs  Commonly known as:  MOTRIN   Take 800 mg by mouth every 8 hours as needed for Mild Pain.  Dose:  800 mg        STOP taking these medications    HYDROcodone-acetaminophen 5-325 MG Tabs per tablet  Commonly known as:  NORCO            Allergies  No Known Allergies    DIET  Orders Placed This Encounter   Procedures   • Diet Order Regular     Standing Status:   Standing     Number of Occurrences:   1     Order Specific Question:   Diet:     Answer:   Regular [1]       ACTIVITY  As tolerated.    CONSULTATIONS  Song  Neurosurgery    PROCEDURES  DX-PORTABLE FLUORO > 1 HOUR   Final Result         Portable fluoroscopy utilized for 1 second.         DX-SPINE-ANY ONE VIEW   Final Result      Portable intraoperative imaging with findings as described above.      DX-CHEST-PORTABLE (1 VIEW)   Final Result         1.  No acute cardiopulmonary disease.      OUTSIDE IMAGES-MR LUMBAR SPINE   Final Result            LABORATORY  Lab Results   Component Value Date    SODIUM 139 10/18/2018    POTASSIUM 4.0 10/18/2018    CHLORIDE 107 10/18/2018    CO2 23 10/18/2018    GLUCOSE 131 (H) 10/18/2018    BUN 15 10/18/2018    CREATININE 0.63 10/18/2018        Lab Results   Component Value Date    WBC 21.4 (H) 10/18/2018    HEMOGLOBIN 13.3 10/18/2018    HEMATOCRIT 40.3 10/18/2018    PLATELETCT 341 10/18/2018        Total time of the discharge process exceeds 45 minutes.

## 2018-10-18 NOTE — CARE PLAN
Problem: Knowledge Deficit  Goal: Knowledge of disease process/condition, treatment plan, diagnostic tests, and medications will improve    Intervention: Explain information regarding disease process/condition, treatment plan, diagnostic tests, and medications and document in education  poc discussed- pt verbalized understanding.       Problem: Pain Management  Goal: Pain level will decrease to patient's comfort goal    Intervention: Follow pain managment plan developed in collaboration with patient and Interdisciplinary Team  Oxy given PRN with +results. Educated pt on importance of pain control- pt verbalized understanding.

## 2018-10-18 NOTE — OR NURSING
Pt dc'd to pt room rr regular, even, spontaneous, vss, pt follows all commands, equal strengths in upper extremities, right lower extremity slightly weaker then left but has become stronger since the beginning of recovery, a&ox4

## 2018-10-18 NOTE — THERAPY
"Physical Therapy Evaluation completed.   Bed Mobility:  Supine to Sit: Supervised  Transfers: Sit to Stand: Supervised  Gait: Level Of Assist: Supervised with No Equipment Needed       Plan of Care: Patient with no further skilled PT needs in the acute care setting at this time  Discharge Recommendations: Equipment: Single Point Cane. Post-acute therapy Recommend outpatient or home [facility] transitional care services for continued physical therapy services.    Ms. Posey is a 44 y/o female who presents to acute secondary to lumbar laminectomy diskectomy and micro disk L 4-5. She presents with mild R LE weakness and sensation deficits that are actually improved from pre-op. Gait and stair sequencing training completed and pt able to perform all mobility without physical assist. She is very guarded when ambulating without AD, recommend SPC to improve confidence and gait jt. No additional acute physical therapy needs. Recommend outpatient physical therapy to continue to address R LE weakness and return her to her desired level of physical activity.    See \"Rehab Therapy-Acute\" Patient Summary Report for complete documentation.     "

## 2018-10-18 NOTE — PROGRESS NOTES
Pt aaox4. RLE weaker than LLE. N/T and foot drop RLE- present before surgery. Denies N/V. Pt c/o back/leg pain, controlled with Oxy PRN.  +BS, good appetite. Voiding w/o difficulty. Dressing CDI. Reviewed poc with pt-verbalized understanding. Pt/Ot pending. Pt to d/c home today. Call light in reach.

## 2018-10-18 NOTE — DISCHARGE INSTRUCTIONS
Discharge Instructions    Discharged to home by car with relative. Discharged via wheelchair, hospital escort: Yes.  Special equipment needed: Not Applicable    Be sure to schedule a follow-up appointment with your primary care doctor or any specialists as instructed.     Discharge Plan:   Diet Plan: Discussed  Activity Level: Discussed  Smoking Cessation Offered: Patient Refused  Confirmed Follow up Appointment: Patient to Call and Schedule Appointment  Confirmed Symptoms Management: Discussed  Medication Reconciliation Updated: Yes  Pneumococcal Vaccine Administered/Refused: Not given - Patient refused pneumococcal vaccine  Influenza Vaccine Indication: Patient Refuses    I understand that a diet low in cholesterol, fat, and sodium is recommended for good health. Unless I have been given specific instructions below for another diet, I accept this instruction as my diet prescription.   Other diet: Regular    Special Instructions: NO pushing, pulling or lifting greater than 10 lbs, NO repetitive bending and twisting, shower ok, pat incision / steri strips dry, no dressing unless drainage, NO non-steroidal anti-inflammatory meds until cleared by MD (for example Motrin, Ibuprofen, Celebrex), call office for incision redness, drainage, chills or increased temperature, ambulate as much as it is comfortable for you and NO driving for at least 2 weeks following surgery.    · Is patient discharged on Warfarin / Coumadin?   No     Depression / Suicide Risk    As you are discharged from this [facility] facility, it is important to learn how to keep safe from harming yourself.    Recognize the warning signs:  · Abrupt changes in personality, positive or negative- including increase in energy   · Giving away possessions  · Change in eating patterns- significant weight changes-  positive or negative  · Change in sleeping patterns- unable to sleep or sleeping all the time   · Unwillingness or inability to  communicate  · Depression  · Unusual sadness, discouragement and loneliness  · Talk of wanting to die  · Neglect of personal appearance   · Rebelliousness- reckless behavior  · Withdrawal from people/activities they love  · Confusion- inability to concentrate     If you or a loved one observes any of these behaviors or has concerns about self-harm, here's what you can do:  · Talk about it- your feelings and reasons for harming yourself  · Remove any means that you might use to hurt yourself (examples: pills, rope, extension cords, firearm)  · Get professional help from the community (Mental Health, Substance Abuse, psychological counseling)  · Do not be alone:Call your Safe Contact- someone whom you trust who will be there for you.  · Call your local CRISIS HOTLINE 832-3603 or 399-675-4550  · Call your local Children's Mobile Crisis Response Team [place] (033) 347-6566 or www.BFKW  · Call the toll free National Suicide Prevention Hotlines   · National Suicide Prevention Lifeline 525-169-DVQB (8308)  · National Hope Line Network 800-SUICIDE (905-1845)

## 2018-10-18 NOTE — PROGRESS NOTES
No prescription for Norco in chart. Pt states she does not have any at home. Paged Dr. Bishop. Prescription and doctor's note provided. Went over discharge instructions w/ pt, when to call the doctor, f/u appointments, medications, spinal precautions. Copy of discharge paperwork given to pt. Pt had no further questions, pt discharged to home w/ family by w/c

## 2018-10-21 ENCOUNTER — APPOINTMENT (OUTPATIENT)
Dept: RADIOLOGY | Facility: MEDICAL CENTER | Age: 43
End: 2018-10-21
Attending: EMERGENCY MEDICINE

## 2018-10-21 ENCOUNTER — HOSPITAL ENCOUNTER (EMERGENCY)
Facility: MEDICAL CENTER | Age: 43
End: 2018-10-21
Attending: EMERGENCY MEDICINE

## 2018-10-21 VITALS
WEIGHT: 140.87 LBS | BODY MASS INDEX: 27.66 KG/M2 | HEART RATE: 97 BPM | OXYGEN SATURATION: 97 % | RESPIRATION RATE: 18 BRPM | SYSTOLIC BLOOD PRESSURE: 142 MMHG | HEIGHT: 60 IN | TEMPERATURE: 98.4 F | DIASTOLIC BLOOD PRESSURE: 89 MMHG

## 2018-10-21 DIAGNOSIS — G89.18 POST-OPERATIVE PAIN: ICD-10-CM

## 2018-10-21 DIAGNOSIS — R20.2 PARESTHESIA: ICD-10-CM

## 2018-10-21 LAB
ALBUMIN SERPL BCP-MCNC: 4 G/DL (ref 3.2–4.9)
ALBUMIN/GLOB SERPL: 1.3 G/DL
ALP SERPL-CCNC: 66 U/L (ref 30–99)
ALT SERPL-CCNC: 18 U/L (ref 2–50)
ANION GAP SERPL CALC-SCNC: 8 MMOL/L (ref 0–11.9)
APTT PPP: 25 SEC (ref 24.7–36)
AST SERPL-CCNC: 16 U/L (ref 12–45)
BASOPHILS # BLD AUTO: 0.5 % (ref 0–1.8)
BASOPHILS # BLD: 0.06 K/UL (ref 0–0.12)
BILIRUB SERPL-MCNC: 0.4 MG/DL (ref 0.1–1.5)
BUN SERPL-MCNC: 11 MG/DL (ref 8–22)
CALCIUM SERPL-MCNC: 9.1 MG/DL (ref 8.5–10.5)
CHLORIDE SERPL-SCNC: 103 MMOL/L (ref 96–112)
CO2 SERPL-SCNC: 26 MMOL/L (ref 20–33)
CREAT SERPL-MCNC: 0.67 MG/DL (ref 0.5–1.4)
D DIMER PPP IA.FEU-MCNC: 0.64 UG/ML (FEU) (ref 0–0.5)
EOSINOPHIL # BLD AUTO: 0.27 K/UL (ref 0–0.51)
EOSINOPHIL NFR BLD: 2.3 % (ref 0–6.9)
ERYTHROCYTE [DISTWIDTH] IN BLOOD BY AUTOMATED COUNT: 48.2 FL (ref 35.9–50)
GLOBULIN SER CALC-MCNC: 3.2 G/DL (ref 1.9–3.5)
GLUCOSE SERPL-MCNC: 82 MG/DL (ref 65–99)
HCT VFR BLD AUTO: 44.4 % (ref 37–47)
HGB BLD-MCNC: 14.7 G/DL (ref 12–16)
IMM GRANULOCYTES # BLD AUTO: 0.03 K/UL (ref 0–0.11)
IMM GRANULOCYTES NFR BLD AUTO: 0.3 % (ref 0–0.9)
INR PPP: 0.9 (ref 0.87–1.13)
LYMPHOCYTES # BLD AUTO: 3.95 K/UL (ref 1–4.8)
LYMPHOCYTES NFR BLD: 34.3 % (ref 22–41)
MCH RBC QN AUTO: 31.7 PG (ref 27–33)
MCHC RBC AUTO-ENTMCNC: 33.1 G/DL (ref 33.6–35)
MCV RBC AUTO: 95.9 FL (ref 81.4–97.8)
MONOCYTES # BLD AUTO: 0.73 K/UL (ref 0–0.85)
MONOCYTES NFR BLD AUTO: 6.3 % (ref 0–13.4)
NEUTROPHILS # BLD AUTO: 6.48 K/UL (ref 2–7.15)
NEUTROPHILS NFR BLD: 56.3 % (ref 44–72)
NRBC # BLD AUTO: 0 K/UL
NRBC BLD-RTO: 0 /100 WBC
PLATELET # BLD AUTO: 371 K/UL (ref 164–446)
PMV BLD AUTO: 8.3 FL (ref 9–12.9)
POTASSIUM SERPL-SCNC: 3.6 MMOL/L (ref 3.6–5.5)
PROT SERPL-MCNC: 7.2 G/DL (ref 6–8.2)
PROTHROMBIN TIME: 12.2 SEC (ref 12–14.6)
RBC # BLD AUTO: 4.63 M/UL (ref 4.2–5.4)
SODIUM SERPL-SCNC: 137 MMOL/L (ref 135–145)
WBC # BLD AUTO: 11.5 K/UL (ref 4.8–10.8)

## 2018-10-21 PROCEDURE — 85730 THROMBOPLASTIN TIME PARTIAL: CPT

## 2018-10-21 PROCEDURE — 99284 EMERGENCY DEPT VISIT MOD MDM: CPT

## 2018-10-21 PROCEDURE — 93971 EXTREMITY STUDY: CPT | Mod: RT

## 2018-10-21 PROCEDURE — 85610 PROTHROMBIN TIME: CPT

## 2018-10-21 PROCEDURE — 80053 COMPREHEN METABOLIC PANEL: CPT

## 2018-10-21 PROCEDURE — 85025 COMPLETE CBC W/AUTO DIFF WBC: CPT

## 2018-10-21 PROCEDURE — 96374 THER/PROPH/DIAG INJ IV PUSH: CPT

## 2018-10-21 PROCEDURE — 700111 HCHG RX REV CODE 636 W/ 250 OVERRIDE (IP): Performed by: EMERGENCY MEDICINE

## 2018-10-21 PROCEDURE — 85379 FIBRIN DEGRADATION QUANT: CPT

## 2018-10-21 RX ORDER — IBUPROFEN 600 MG/1
600 TABLET ORAL ONCE
Status: DISCONTINUED | OUTPATIENT
Start: 2018-10-21 | End: 2018-10-21 | Stop reason: HOSPADM

## 2018-10-21 RX ORDER — KETOROLAC TROMETHAMINE 30 MG/ML
30 INJECTION, SOLUTION INTRAMUSCULAR; INTRAVENOUS ONCE
Status: COMPLETED | OUTPATIENT
Start: 2018-10-21 | End: 2018-10-21

## 2018-10-21 RX ADMIN — KETOROLAC TROMETHAMINE 30 MG: 30 INJECTION, SOLUTION INTRAMUSCULAR at 17:01

## 2018-10-21 NOTE — ED PROVIDER NOTES
ED Provider Note    Scribed for Paz Miller M.D. by Rach Cid. 10/21/2018  3:22 PM    Primary care provider: Pcp Pt States None  Means of arrival: Walk-in  History obtained from: Patient  History limited by: None    CHIEF COMPLAINT  Chief Complaint   Patient presents with   • Back Pain   • Extremity Weakness       HPI  Jose Nunez is a 43 y.o. female who presents to the Emergency Department with chronic back pain worsening in severity last night. The patient had a Disectomy to her L4 and L5 on 10/17 preformed by Dr. Cid. A fragment was pushing on the right side of her L4 and L5. She was discharged home with no complications from the surgery. The patient presents today with moderate to severe back pain located to her right side. Associated right leg numbness and edema is noted. She took a Naproxen yesterday evening with mild relief to her pain. Movement exacerbates her back pain. The patient denies bowel or urianary incontinence, diarrhea, fever, vomiting, chest pain, and shortness of breath.    The patient reports she was advised to make a follow up appointment tomorrow with Dr. Cid.     REVIEW OF SYSTEMS  HEENT:  No ear pain, congestion, or sore throat   EYES: no discharge, redness, or vision changes  CARDIAC: no chest pain, no palpitations, no shortness of breath.    PULMONARY: no dyspnea, cough, or congestion   GI: no vomiting, diarrhea, or abdominal pain.  : no dysuria or hematuria. Positive for back pain.   Neuro: no weakness, numbness, aphasia, or headache. No bowel or urinary incontinence.  Musculoskeletal: no deformity, pain, or joint swelling. Positive for right leg numbness and edema.  Endocrine: no fevers, sweating, or weight loss   SKIN: no rash, erythema, or contusions     See history of present illness. All other systems are negative.     PAST MEDICAL HISTORY   has a past medical history of Uterine prolapse.    SURGICAL HISTORY   has a past surgical history that includes tubal ligation  and lumbar laminectomy diskectomy (Right, 10/17/2018).    SOCIAL HISTORY  Social History   Substance Use Topics   • Smoking status: Current Every Day Smoker     Packs/day: 0.50     Types: Cigarettes   • Smokeless tobacco: Never Used   • Alcohol use Yes      Comment: occ      History   Drug Use   • Types: Intravenous, Inhaled     Comment: meth occasionally        FAMILY HISTORY  No family history on file.    CURRENT MEDICATIONS  Home Medications     Reviewed by Kristie Hawkins R.N. (Registered Nurse) on 10/21/18 at 1519  Med List Status: Complete   Medication Last Dose Status   ibuprofen (MOTRIN) 800 MG Tab 10/17/2018 Active   oxyCODONE immediate-release (ROXICODONE) 5 MG Tab 10/20/2018 Active   senna-docusate (PERICOLACE OR SENOKOT S) 8.6-50 MG Tab 10/20/2018 Active                ALLERGIES  No Known Allergies    PHYSICAL EXAM  VITAL SIGNS: /89   Pulse (!) 106   Temp 36.9 °C (98.4 °F)   Resp 17   Ht 1.524 m (5')   Wt 63.9 kg (140 lb 14 oz)   LMP 10/08/2018   SpO2 96%   BMI 27.51 kg/m²     Constitutional: Well developed, Well nourished, No acute distress, Non-toxic appearance.   HEENT: Normocephalic, Atraumatic,  external ears normal, pharynx pink,  Mucous  Membranes moist, No rhinorrhea or mucosal edema  Eyes: PERRL, EOMI, Conjunctiva normal, No discharge.   Cardiovascular: Regular Rate and Rhythm, No murmurs,  rubs, or gallops.   Thorax & Lungs: Lungs clear to auscultation bilaterally, No respiratory distress, No wheezes, rhales or rhonchi, No chest wall tenderness.   Skin: Warm, Dry, No erythema, No rash,   Back:  No CVA tenderness,  No spinal tenderness, bony crepitance, step offs, or instability.   Neurologic: Alert & oriented x 3, Normal motor function, Normal sensory function, No focal deficits noted. Normal reflexes. Normal Cranial Nerves. Numbness of right foot.   Extremities: Equal, intact distal pulses, No cyanosis, clubbing. Diffuse edema 2+ right. Calf tenderness. Compared to left where  calf is soft without edema. 4/5 strength in left lower extremity. Normal capillary refill. No foot drop on exam. Deep pain between 4th and 5th toes on right foot. Normal DP and PT pulses. Good ROM with decreased sensation of the right foot.   Musculoskeletal: Good range of motion in all major joints. No tenderness to palpation or major deformities noted.      DIAGNOSTIC STUDIES / PROCEDURES    LABS  Results for orders placed or performed during the hospital encounter of 10/21/18   CBC WITH DIFFERENTIAL   Result Value Ref Range    WBC 11.5 (H) 4.8 - 10.8 K/uL    RBC 4.63 4.20 - 5.40 M/uL    Hemoglobin 14.7 12.0 - 16.0 g/dL    Hematocrit 44.4 37.0 - 47.0 %    MCV 95.9 81.4 - 97.8 fL    MCH 31.7 27.0 - 33.0 pg    MCHC 33.1 (L) 33.6 - 35.0 g/dL    RDW 48.2 35.9 - 50.0 fL    Platelet Count 371 164 - 446 K/uL    MPV 8.3 (L) 9.0 - 12.9 fL    Neutrophils-Polys 56.30 44.00 - 72.00 %    Lymphocytes 34.30 22.00 - 41.00 %    Monocytes 6.30 0.00 - 13.40 %    Eosinophils 2.30 0.00 - 6.90 %    Basophils 0.50 0.00 - 1.80 %    Immature Granulocytes 0.30 0.00 - 0.90 %    Nucleated RBC 0.00 /100 WBC    Neutrophils (Absolute) 6.48 2.00 - 7.15 K/uL    Lymphs (Absolute) 3.95 1.00 - 4.80 K/uL    Monos (Absolute) 0.73 0.00 - 0.85 K/uL    Eos (Absolute) 0.27 0.00 - 0.51 K/uL    Baso (Absolute) 0.06 0.00 - 0.12 K/uL    Immature Granulocytes (abs) 0.03 0.00 - 0.11 K/uL    NRBC (Absolute) 0.00 K/uL   COMP METABOLIC PANEL   Result Value Ref Range    Sodium 137 135 - 145 mmol/L    Potassium 3.6 3.6 - 5.5 mmol/L    Chloride 103 96 - 112 mmol/L    Co2 26 20 - 33 mmol/L    Anion Gap 8.0 0.0 - 11.9    Glucose 82 65 - 99 mg/dL    Bun 11 8 - 22 mg/dL    Creatinine 0.67 0.50 - 1.40 mg/dL    Calcium 9.1 8.5 - 10.5 mg/dL    AST(SGOT) 16 12 - 45 U/L    ALT(SGPT) 18 2 - 50 U/L    Alkaline Phosphatase 66 30 - 99 U/L    Total Bilirubin 0.4 0.1 - 1.5 mg/dL    Albumin 4.0 3.2 - 4.9 g/dL    Total Protein 7.2 6.0 - 8.2 g/dL    Globulin 3.2 1.9 - 3.5 g/dL    A-G  Ratio 1.3 g/dL   PROTHROMBIN TIME   Result Value Ref Range    PT 12.2 12.0 - 14.6 sec    INR 0.90 0.87 - 1.13   APTT   Result Value Ref Range    APTT 25.0 24.7 - 36.0 sec   D-DIMER   Result Value Ref Range    D-Dimer Screen 0.64 (H) 0.00 - 0.50 ug/mL (FEU)   ESTIMATED GFR   Result Value Ref Range    GFR If African American >60 >60 mL/min/1.73 m 2    GFR If Non African American >60 >60 mL/min/1.73 m 2     All labs reviewed by me.    RADIOLOGY  US-EXTREMITY VENOUS LOWER UNILAT RIGHT   Final Result        The radiologist's interpretation of all radiological studies have been reviewed by me.    COURSE & MEDICAL DECISION MAKING  Nursing notes, VS, PMSFHx reviewed in chart.    3:22 PM Patient seen and examined at bedside. Ordered US-Extremity Venous Lower UniLat Right, CBC with Differential, CMP, PTT, APTT to evaluate her symptoms. The differential diagnoses include but are not limited to: DVT, Complications from Surgery, Post-op pain. I will order an ultrasound and blood work for further workup. Additionally, I will consult with Dr. Cid to create a plan of care.     3:57 PM Paged Dr. Cid.     4:02 PM I discussed the patient's case and the above findings with Dr. Cid (Surgery) who states he will see her in his office tomorrow for a follow up. He did not wish for an MRI at this time.     4:10 PM - Patient will be treated with Motrin 600 mg. Ordered for D-Dimer and Influenza A/B by PCR.    4:47 PM - Recheck: Patient is resting comfortably and reports feeling improved. I updated her on her results, which did not indicate any acute abnormalities. I explained that she is now stable for discharge. I advised her to follow up with Dr. Cid tomorrow and to return to the ED for new or worsening symptoms. She understands and will comply.     4:49 PM - Patient will be treated with Toradol injection 30 mg.     Patient has had high blood pressure while in the emergency department, felt likely secondary to medical condition.  Counseled patient to monitor blood pressure at home and follow up with primary care physician.    DISPOSITION:  Patient will be discharged home in stable condition.    FOLLOW UP:  Jevon Cid M.D.  [id number] [facility] [address] [address]  [phone]    Call in 1 day  for recheck      OUTPATIENT MEDICATIONS:  Discharge Medication List as of 10/21/2018  4:55 PM          FINAL IMPRESSION  1. Paresthesia    2. Post-operative pain          Rach BAKER (Scribe), am scribing for, and in the presence of, Paz Miller M.D..    Electronically signed by: Rach Cid (Scribe), 10/21/2018    Paz BAKER M.D. personally performed the services described in this documentation, as scribed by Rach Cid in my presence, and it is both accurate and complete.    The note accurately reflects work and decisions made by me.  Paz Miller  10/21/2018  7:27 PM CKeila

## 2018-10-21 NOTE — ED TRIAGE NOTES
Chief Complaint   Patient presents with   • Back Pain   • Extremity Weakness     Pt had recent back surgery. Last night began to have worsened numbness to right leg. Unable to stand without assistance. Reports + foot drop.  Denies Loss of BorB.   Blood pressure 142/89, pulse (!) 106, temperature 36.9 °C (98.4 °F), resp. rate 17, height 1.524 m (5'), weight 63.9 kg (140 lb 14 oz), SpO2 96 %, not currently breastfeeding.    Pt informed of wait times. Educated on triage process.  Asked to return to triage RN for any new or worsening of symptoms. Thanked for patience.

## 2018-10-22 NOTE — ED NOTES
D/C home with written and verbal instructions re: Rx, activity, f/u.  Verbalizes understanding.  Patient wheeled out to lobby to await ride.

## 2018-11-06 ENCOUNTER — HOSPITAL ENCOUNTER (EMERGENCY)
Facility: MEDICAL CENTER | Age: 43
End: 2018-11-06
Attending: EMERGENCY MEDICINE
Payer: COMMERCIAL

## 2018-11-06 VITALS
DIASTOLIC BLOOD PRESSURE: 87 MMHG | WEIGHT: 135.14 LBS | HEART RATE: 98 BPM | RESPIRATION RATE: 16 BRPM | BODY MASS INDEX: 26.53 KG/M2 | SYSTOLIC BLOOD PRESSURE: 116 MMHG | HEIGHT: 60 IN | OXYGEN SATURATION: 97 % | TEMPERATURE: 97.8 F

## 2018-11-06 DIAGNOSIS — M62.830 BACK SPASM: ICD-10-CM

## 2018-11-06 PROCEDURE — 700111 HCHG RX REV CODE 636 W/ 250 OVERRIDE (IP): Mod: JW | Performed by: EMERGENCY MEDICINE

## 2018-11-06 PROCEDURE — 99284 EMERGENCY DEPT VISIT MOD MDM: CPT

## 2018-11-06 PROCEDURE — A9270 NON-COVERED ITEM OR SERVICE: HCPCS | Performed by: EMERGENCY MEDICINE

## 2018-11-06 PROCEDURE — 96372 THER/PROPH/DIAG INJ SC/IM: CPT

## 2018-11-06 PROCEDURE — 700102 HCHG RX REV CODE 250 W/ 637 OVERRIDE(OP): Performed by: EMERGENCY MEDICINE

## 2018-11-06 RX ORDER — METHOCARBAMOL 750 MG/1
1500 TABLET, FILM COATED ORAL 3 TIMES DAILY PRN
Qty: 22 TAB | Refills: 0 | Status: SHIPPED | OUTPATIENT
Start: 2018-11-06 | End: 2018-11-06

## 2018-11-06 RX ORDER — METHOCARBAMOL 750 MG/1
1500 TABLET, FILM COATED ORAL 3 TIMES DAILY PRN
Qty: 22 TAB | Refills: 0 | Status: SHIPPED | OUTPATIENT
Start: 2018-11-06

## 2018-11-06 RX ORDER — KETOROLAC TROMETHAMINE 30 MG/ML
15 INJECTION, SOLUTION INTRAMUSCULAR; INTRAVENOUS ONCE
Status: COMPLETED | OUTPATIENT
Start: 2018-11-06 | End: 2018-11-06

## 2018-11-06 RX ORDER — METHOCARBAMOL 750 MG/1
1500 TABLET, FILM COATED ORAL ONCE
Status: COMPLETED | OUTPATIENT
Start: 2018-11-06 | End: 2018-11-06

## 2018-11-06 RX ADMIN — KETOROLAC TROMETHAMINE 15 MG: 30 INJECTION, SOLUTION INTRAMUSCULAR at 01:58

## 2018-11-06 RX ADMIN — METHOCARBAMOL 1500 MG: 750 TABLET ORAL at 01:57

## 2018-11-06 ASSESSMENT — ENCOUNTER SYMPTOMS
BACK PAIN: 1
NAUSEA: 0
CHILLS: 0
VOMITING: 0
FEVER: 0

## 2018-11-06 ASSESSMENT — PAIN SCALES - GENERAL: PAINLEVEL_OUTOF10: 6
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
## 2018-11-09 ENCOUNTER — HOSPITAL ENCOUNTER (EMERGENCY)
Facility: MEDICAL CENTER | Age: 43
End: 2018-11-09
Attending: EMERGENCY MEDICINE
Payer: COMMERCIAL

## 2018-11-09 VITALS
DIASTOLIC BLOOD PRESSURE: 61 MMHG | SYSTOLIC BLOOD PRESSURE: 97 MMHG | TEMPERATURE: 96.8 F | BODY MASS INDEX: 25.22 KG/M2 | HEIGHT: 61 IN | RESPIRATION RATE: 16 BRPM | HEART RATE: 57 BPM | OXYGEN SATURATION: 98 % | WEIGHT: 133.6 LBS

## 2018-11-09 DIAGNOSIS — M54.16 LUMBAR BACK PAIN WITH RADICULOPATHY AFFECTING LEFT LOWER EXTREMITY: ICD-10-CM

## 2018-11-09 LAB
APPEARANCE UR: CLEAR
BILIRUB UR QL STRIP.AUTO: NEGATIVE
COLOR UR: ABNORMAL
GLUCOSE UR STRIP.AUTO-MCNC: NEGATIVE MG/DL
KETONES UR STRIP.AUTO-MCNC: ABNORMAL MG/DL
LEUKOCYTE ESTERASE UR QL STRIP.AUTO: NEGATIVE
MICRO URNS: ABNORMAL
NITRITE UR QL STRIP.AUTO: NEGATIVE
PH UR STRIP.AUTO: 5 [PH]
PROT UR QL STRIP: NEGATIVE MG/DL
RBC UR QL AUTO: NEGATIVE
SP GR UR STRIP.AUTO: 1.03
UROBILINOGEN UR STRIP.AUTO-MCNC: 1 MG/DL

## 2018-11-09 PROCEDURE — 96372 THER/PROPH/DIAG INJ SC/IM: CPT

## 2018-11-09 PROCEDURE — 700111 HCHG RX REV CODE 636 W/ 250 OVERRIDE (IP): Performed by: EMERGENCY MEDICINE

## 2018-11-09 PROCEDURE — 99284 EMERGENCY DEPT VISIT MOD MDM: CPT

## 2018-11-09 PROCEDURE — 87591 N.GONORRHOEAE DNA AMP PROB: CPT

## 2018-11-09 PROCEDURE — 87491 CHLMYD TRACH DNA AMP PROBE: CPT

## 2018-11-09 PROCEDURE — 81003 URINALYSIS AUTO W/O SCOPE: CPT

## 2018-11-09 RX ORDER — KETOROLAC TROMETHAMINE 30 MG/ML
30 INJECTION, SOLUTION INTRAMUSCULAR; INTRAVENOUS ONCE
Status: COMPLETED | OUTPATIENT
Start: 2018-11-09 | End: 2018-11-09

## 2018-11-09 RX ADMIN — KETOROLAC TROMETHAMINE 30 MG: 30 INJECTION, SOLUTION INTRAMUSCULAR at 03:41

## 2018-11-09 ASSESSMENT — LIFESTYLE VARIABLES
HOW MANY TIMES IN THE PAST YEAR HAVE YOU HAD 5 OR MORE DRINKS IN A DAY: 0
TOTAL SCORE: 0
AVERAGE NUMBER OF DAYS PER WEEK YOU HAVE A DRINK CONTAINING ALCOHOL: 1
CONSUMPTION TOTAL: NEGATIVE
TOTAL SCORE: 0
HAVE PEOPLE ANNOYED YOU BY CRITICIZING YOUR DRINKING: NO
TOTAL SCORE: 0
DO YOU DRINK ALCOHOL: YES
EVER HAD A DRINK FIRST THING IN THE MORNING TO STEADY YOUR NERVES TO GET RID OF A HANGOVER: NO
ON A TYPICAL DAY WHEN YOU DRINK ALCOHOL HOW MANY DRINKS DO YOU HAVE: 2
EVER FELT BAD OR GUILTY ABOUT YOUR DRINKING: NO
HAVE YOU EVER FELT YOU SHOULD CUT DOWN ON YOUR DRINKING: NO

## 2018-11-09 ASSESSMENT — PAIN SCALES - GENERAL: PAINLEVEL_OUTOF10: 8

## 2018-11-09 NOTE — DISCHARGE INSTRUCTIONS
You were seen in the Emergency Department for back pain.    Urine were completed without significant acute abnormalities.  Gonorrhea and Chlamydia testing was performed at your request.  We will call you if these results are positive.    Please use 1,000mg of tylenol or 600mg of ibuprofen every 6 hours as needed for pain.  You may also use the naproxen, Norco, or Robaxin that has been recently prescribed to you.    Please call Dr. Cid for immediate follow-up.    Return to the Emergency Department with fevers greater than 100.4, uncontrolled pain, new numbness or weakness to the lower extremities, incontinence, or other concerns.

## 2018-11-09 NOTE — ED TRIAGE NOTES
"Jose Nunez  Chief Complaint   Patient presents with   • Back Pain     Pt complains of lower back pain x2 weeks, pt had disk surgery on the 18th of september. Pt states pain has continued to get worse. Pt believes this is a slipped disk.      Pt has a difficult time ambulating and sitting up straight.    Pt ambulated to triage with above complaint.     /71   Pulse (!) 115   Temp 36.3 °C (97.3 °F)   Resp 18   Ht 1.537 m (5' 0.5\")   Wt 60.6 kg (133 lb 9.6 oz)   LMP 11/05/2018   SpO2 98%   BMI 25.66 kg/m²     Pt informed of triage process and encouraged to notify staff of any changes or concerns. Pt verbalized understanding of instructions. Apologized for long wait time. Pt placed back in lobby.     "

## 2018-11-09 NOTE — ED PROVIDER NOTES
ED Provider Note    Scribed for Stephanie Fajardo M.D. by Julio Larry. 11/9/2018  3:21 AM    Means of arrival: Walk in  History obtained from: Patient  History limited by: None      CHIEF COMPLAINT  Chief Complaint   Patient presents with   • Back Pain     Pt complains of lower back pain x2 weeks, pt had disk surgery on the 18th of september. Pt states pain has continued to get worse. Pt believes this is a slipped disk.        ALEXSANDRA Nunez is a 43 y.o. female with history of chronic back pain in addition to polysubstance abuse who presents to the Emergency Department for evaluation of acute lower back pain that radiates out to her bilateral flanks and down to her buttocks onset tonight prior to arrival. She additionally endorses mild dysuria earlier today. The patient has prior history of a herniated disc that required surgery and states the pain tonight feels similar.  She has been seen multiple times recently in the emergency department for the same.  She developed drop foot and a tingling sensation in her right foot following the surgery, but reports no new numbness or weakness. She is currently prescribed Naproxen and Norco to manage her back pain, but states she has not taken any medications for alleviation tonight. The patient reports no heavy lifting or injury that could have caused the back pain. She denies bowel or bladder incontinence, fever, or hematuria. The patient states she consumed alcohol earlier tonight at 8 PM, and additionally last used IV methamphetamine 5 days ago. No alleviating or exacerbating factors are identified at this time.     REVIEW OF SYSTEMS  Pertinent positive include lower back pain and dysuria. Pertinent negative include incontinence, fever, or hematuria.    PAST MEDICAL HISTORY   has a past medical history of Uterine prolapse.    SOCIAL HISTORY  Social History     Social History Main Topics   • Smoking status: Current Every Day Smoker     Packs/day: 0.50     Types:  "Cigarettes   • Smokeless tobacco: Never Used   • Alcohol use Yes      Comment: occ   • Drug use: Yes     Types: Intravenous, Inhaled      Comment: meth occasionally, last used this am   • Sexual activity: None noted       SURGICAL HISTORY   has a past surgical history that includes tubal ligation and lumbar laminectomy diskectomy (Right, 10/17/2018).    CURRENT MEDICATIONS  Home Medications    **Home medications have not yet been reviewed for this encounter**         ALLERGIES  No Known Allergies    PHYSICAL EXAM   VITAL SIGNS: /71   Pulse (!) 115   Temp 36.3 °C (97.3 °F)   Resp 18   Ht 1.537 m (5' 0.5\")   Wt 60.6 kg (133 lb 9.6 oz)   LMP 11/05/2018   SpO2 98%   BMI 25.66 kg/m²    Constitutional: Nontoxic appearing female, initially sleeping however upon awakening all of a sudden appears in pain.  She then drifts off to sleep during my exam.  HENT: Normocephalic, Atraumatic. Bilateral external ears normal. Nose normal. Moist mucous membranes.  Neck: Supple, full range of motion.  Eyes: Pupils are equal and reactive. Conjunctiva normal.   Heart: Regular rate and rhythm. No murmurs.    Lungs: No respiratory distress.  Normal work of breathing.  Clear to auscultation bilaterally.  Abdomen:  Soft, no distention. No tenderness to palpation  Skin: Warm, Dry. No rash.   Back: Back atraumatic, skin normal without overlying erythema.  No midline lumbar tenderness to palpation.  Tenderness overlying the left SI joint.  Neurologic: Alert and oriented x3. Positive straight leg raise of left lower extremity. Chronic 4/5 strength in right lower extremity, otherwise 5/5 strength throughout. Sensation intact.  Normal gait.  Psychiatric: Affect normal, Mood normal, Appears appropriate and not intoxicated.    DIAGNOSTIC STUDIES    LABS  Personally reviewed by me  Labs Reviewed   URINALYSIS,CULTURE IF INDICATED - Abnormal; Notable for the following:        Result Value    Ketones Trace (*)     All other components within " "normal limits   CHLAMYDIA/GC PCR URINE OR SWAB       ED COURSE  Vitals:    11/09/18 0222 11/09/18 0226 11/09/18 0437   BP: 117/71  (!) 97/61   Pulse: (!) 115  (!) 57   Resp: 18  16   Temp: 36.3 °C (97.3 °F)  36 °C (96.8 °F)   SpO2: 98%  98%   Weight:  60.6 kg (133 lb 9.6 oz)    Height: 1.537 m (5' 0.5\")           Medications administered:  Medications   ketorolac (TORADOL) injection 30 mg (30 mg Intramuscular Given 11/9/18 0341)         Old records personally reviewed:  Obtained and reviewed past medical records that indicate patient was seen in the ED on 10/21/18 and 11/6/2018 for similar complaint.   She was admitted on 10/17/2018 due to large central disc protrusion at L4-L5 and underwent a lumbar laminectomy discectomy with Dr. Cid  MRI dated 10/15/2018 demonstrates large central disc protrusion at L4-5. No epidural abscess, discitis, osteomyelitis.    3:21 AM Patient seen and examined at bedside. The patient presents with lower back pain. Ordered for Urinalysis and Chlamydia/GC PCR Urine to evaluate. Patient will be treated with Toradol 30 mg for her symptoms.     MEDICAL DECISION MAKING  Patient with recent laminectomy/discectomy for lumbar disc protrusion presents for the third time since her surgery with continued back pain.  The patient denies any new, acute, or changing symptoms.  She has a history of right lower extremity weakness and sensation deficit prior to her surgery which is unchanged.  She has no new neurologic deficits on exam.  She denies associated incontinence, saddle anesthesias concerning for cauda equina syndrome.  Patient does have a history of IV drug use however no other infectious symptoms such as fevers, overlying redness to her incision site to suggest epidural abscess or underlying infectious process.  Patient was seen by Dr. Cid in the last 2 days and evaluated for similar symptoms without reported concern for infection.  Urinalysis is negative for infection or blood to suggest " nephrolithiasis.  Patient is requesting gonorrhea and Chlamydia testing however she denies any current symptoms therefore urine was sent.  Plan to treat with IM Toradol followed by reassessment.    I discussed the case with CLEMENTE Dotson on-call for Dr. Cid.  She is familiar with this patient and states she was seen in clinic a few days prior without concern for any postoperative infection.  She has had a postoperative MRI done on 10/27 in [place], [place] which showed mild recurrent disc bulge however no signs of infection.  While she is at risk of infection due to her IV drug use, they believe that her symptoms are more likely due to the fact she is not following postoperative restrictions.  She does not feel that we need to do another MRI today and will ensure she gets close follow-up in clinic.    4:32 AM - Patient reevaluated at bedside. She continues to be sleeping without evidence of distress.  Tachycardia has improved without intervention.  Patient remains afebrile.  Patient was updated with lab results that show no sign of UTI. She is made aware she will be discharged at this time.  She understands importance of calling Dr. Cid's office in the morning for continued follow-up as well as strict return precautions for changing or worsening symptoms.      DISPOSITION:  Patient will be discharged home in stable condition.    FOLLOW UP:  Jevon Cid M.D.  [id number] [address]  [phone]    Schedule an appointment as soon as possible for a visit       [facility], Emergency Dept  [address] [facility] [address] [phone]    If symptoms worsen      OUTPATIENT MEDICATIONS:  New Prescriptions    No medications on file       IMPRESSION  (M54.16) Lumbar back pain with radiculopathy affecting left lower extremity    Results, diagnoses, and treatment options were discussed with the patient and/or family. Patient verbalized understanding of plan of  care.    New Prescriptions    No medications on file       Julio BAKER ([place]), am scribing for, and in the presence of, Stephanie Fajardo M.D..    Electronically signed by: Julio Larry ([place]), 11/9/2018    Stephanie BAKER M.D. personally performed the services described in this documentation, as scribed by Julio Larry in my presence, and it is both accurate and complete.    E.    The note accurately reflects work and decisions made by me.  Stephanie Fajardo  11/9/2018  5:17 AM

## 2018-11-09 NOTE — ED TRIAGE NOTES
Pt reports low back pain, she has used vicodin without relief, pt denies any new numbness to extremities, pt states right leg is always numb, pt ambulated to er bed 61 from triage

## 2018-11-10 LAB
C TRACH DNA SPEC QL NAA+PROBE: NEGATIVE
N GONORRHOEA DNA SPEC QL NAA+PROBE: NEGATIVE
SPECIMEN SOURCE: NORMAL

## 2019-01-18 ENCOUNTER — HOSPITAL ENCOUNTER (EMERGENCY)
Facility: MEDICAL CENTER | Age: 44
End: 2019-01-18
Attending: EMERGENCY MEDICINE
Payer: COMMERCIAL

## 2019-01-18 VITALS
HEIGHT: 60 IN | RESPIRATION RATE: 16 BRPM | BODY MASS INDEX: 27.48 KG/M2 | SYSTOLIC BLOOD PRESSURE: 106 MMHG | HEART RATE: 94 BPM | DIASTOLIC BLOOD PRESSURE: 61 MMHG | TEMPERATURE: 98.2 F | WEIGHT: 139.99 LBS | OXYGEN SATURATION: 98 %

## 2019-01-18 DIAGNOSIS — R10.84 GENERALIZED ABDOMINAL PAIN: ICD-10-CM

## 2019-01-18 DIAGNOSIS — R19.7 VOMITING AND DIARRHEA: ICD-10-CM

## 2019-01-18 DIAGNOSIS — R11.10 VOMITING AND DIARRHEA: ICD-10-CM

## 2019-01-18 LAB
ALBUMIN SERPL BCP-MCNC: 4.4 G/DL (ref 3.2–4.9)
ALBUMIN/GLOB SERPL: 1.4 G/DL
ALP SERPL-CCNC: 64 U/L (ref 30–99)
ALT SERPL-CCNC: 20 U/L (ref 2–50)
ANION GAP SERPL CALC-SCNC: 11 MMOL/L (ref 0–11.9)
APPEARANCE UR: CLEAR
AST SERPL-CCNC: 21 U/L (ref 12–45)
BASOPHILS # BLD AUTO: 0.8 % (ref 0–1.8)
BASOPHILS # BLD: 0.08 K/UL (ref 0–0.12)
BILIRUB SERPL-MCNC: 0.5 MG/DL (ref 0.1–1.5)
BILIRUB UR QL STRIP.AUTO: NEGATIVE
BUN SERPL-MCNC: 7 MG/DL (ref 8–22)
CALCIUM SERPL-MCNC: 9.5 MG/DL (ref 8.5–10.5)
CHLORIDE SERPL-SCNC: 108 MMOL/L (ref 96–112)
CO2 SERPL-SCNC: 24 MMOL/L (ref 20–33)
COLOR UR: YELLOW
CREAT SERPL-MCNC: 0.69 MG/DL (ref 0.5–1.4)
EOSINOPHIL # BLD AUTO: 0.25 K/UL (ref 0–0.51)
EOSINOPHIL NFR BLD: 2.5 % (ref 0–6.9)
ERYTHROCYTE [DISTWIDTH] IN BLOOD BY AUTOMATED COUNT: 44.8 FL (ref 35.9–50)
GLOBULIN SER CALC-MCNC: 3.1 G/DL (ref 1.9–3.5)
GLUCOSE SERPL-MCNC: 102 MG/DL (ref 65–99)
GLUCOSE UR STRIP.AUTO-MCNC: NEGATIVE MG/DL
HCG SERPL QL: NEGATIVE
HCT VFR BLD AUTO: 42.8 % (ref 37–47)
HGB BLD-MCNC: 14.6 G/DL (ref 12–16)
IMM GRANULOCYTES # BLD AUTO: 0.01 K/UL (ref 0–0.11)
IMM GRANULOCYTES NFR BLD AUTO: 0.1 % (ref 0–0.9)
KETONES UR STRIP.AUTO-MCNC: NEGATIVE MG/DL
LEUKOCYTE ESTERASE UR QL STRIP.AUTO: NEGATIVE
LIPASE SERPL-CCNC: 14 U/L (ref 11–82)
LYMPHOCYTES # BLD AUTO: 3.17 K/UL (ref 1–4.8)
LYMPHOCYTES NFR BLD: 32 % (ref 22–41)
MCH RBC QN AUTO: 31.9 PG (ref 27–33)
MCHC RBC AUTO-ENTMCNC: 34.1 G/DL (ref 33.6–35)
MCV RBC AUTO: 93.7 FL (ref 81.4–97.8)
MICRO URNS: NORMAL
MONOCYTES # BLD AUTO: 0.98 K/UL (ref 0–0.85)
MONOCYTES NFR BLD AUTO: 9.9 % (ref 0–13.4)
NEUTROPHILS # BLD AUTO: 5.41 K/UL (ref 2–7.15)
NEUTROPHILS NFR BLD: 54.7 % (ref 44–72)
NITRITE UR QL STRIP.AUTO: NEGATIVE
NRBC # BLD AUTO: 0 K/UL
NRBC BLD-RTO: 0 /100 WBC
PH UR STRIP.AUTO: 5.5 [PH]
PLATELET # BLD AUTO: 415 K/UL (ref 164–446)
PMV BLD AUTO: 9 FL (ref 9–12.9)
POTASSIUM SERPL-SCNC: 4.1 MMOL/L (ref 3.6–5.5)
PROT SERPL-MCNC: 7.5 G/DL (ref 6–8.2)
PROT UR QL STRIP: NEGATIVE MG/DL
RBC # BLD AUTO: 4.57 M/UL (ref 4.2–5.4)
RBC UR QL AUTO: NEGATIVE
SODIUM SERPL-SCNC: 143 MMOL/L (ref 135–145)
SP GR UR STRIP.AUTO: 1.01
UROBILINOGEN UR STRIP.AUTO-MCNC: 1 MG/DL
WBC # BLD AUTO: 9.9 K/UL (ref 4.8–10.8)

## 2019-01-18 PROCEDURE — 83690 ASSAY OF LIPASE: CPT

## 2019-01-18 PROCEDURE — 80053 COMPREHEN METABOLIC PANEL: CPT

## 2019-01-18 PROCEDURE — 700111 HCHG RX REV CODE 636 W/ 250 OVERRIDE (IP): Performed by: EMERGENCY MEDICINE

## 2019-01-18 PROCEDURE — 700102 HCHG RX REV CODE 250 W/ 637 OVERRIDE(OP): Performed by: EMERGENCY MEDICINE

## 2019-01-18 PROCEDURE — 85025 COMPLETE CBC W/AUTO DIFF WBC: CPT

## 2019-01-18 PROCEDURE — A9270 NON-COVERED ITEM OR SERVICE: HCPCS | Performed by: EMERGENCY MEDICINE

## 2019-01-18 PROCEDURE — 700105 HCHG RX REV CODE 258: Performed by: EMERGENCY MEDICINE

## 2019-01-18 PROCEDURE — 96374 THER/PROPH/DIAG INJ IV PUSH: CPT

## 2019-01-18 PROCEDURE — 36415 COLL VENOUS BLD VENIPUNCTURE: CPT

## 2019-01-18 PROCEDURE — 81003 URINALYSIS AUTO W/O SCOPE: CPT

## 2019-01-18 PROCEDURE — 84703 CHORIONIC GONADOTROPIN ASSAY: CPT

## 2019-01-18 PROCEDURE — 99285 EMERGENCY DEPT VISIT HI MDM: CPT

## 2019-01-18 RX ORDER — ONDANSETRON 4 MG/1
4 TABLET, ORALLY DISINTEGRATING ORAL EVERY 8 HOURS PRN
Qty: 8 TAB | Refills: 0 | Status: SHIPPED | OUTPATIENT
Start: 2019-01-18

## 2019-01-18 RX ORDER — ONDANSETRON 2 MG/ML
4 INJECTION INTRAMUSCULAR; INTRAVENOUS ONCE
Status: COMPLETED | OUTPATIENT
Start: 2019-01-18 | End: 2019-01-18

## 2019-01-18 RX ORDER — CYCLOBENZAPRINE HCL 10 MG
10 TABLET ORAL 3 TIMES DAILY PRN
COMMUNITY

## 2019-01-18 RX ORDER — SODIUM CHLORIDE 9 MG/ML
1000 INJECTION, SOLUTION INTRAVENOUS ONCE
Status: COMPLETED | OUTPATIENT
Start: 2019-01-18 | End: 2019-01-18

## 2019-01-18 RX ADMIN — LIDOCAINE HYDROCHLORIDE 30 ML: 20 SOLUTION OROPHARYNGEAL at 09:25

## 2019-01-18 RX ADMIN — SODIUM CHLORIDE 1000 ML: 9 INJECTION, SOLUTION INTRAVENOUS at 08:02

## 2019-01-18 RX ADMIN — ONDANSETRON 4 MG: 2 INJECTION INTRAMUSCULAR; INTRAVENOUS at 08:12

## 2019-01-18 ASSESSMENT — PAIN SCALES - GENERAL: PAINLEVEL_OUTOF10: 0

## 2019-01-18 NOTE — ED TRIAGE NOTES
"Donnyallencara Enrique  43 y.o. female  Chief Complaint   Patient presents with   • Nausea/Vomiting/Diarrhea     Since aprox 2300 (1/17). Pt states \"I tried the seafood salad at [facility].\"       Pt amb to triage with steady gait for above complaint. Pt also c/o associated abd pain. Reports 6/10 \"aching\" nonradiating epigastric pain. States, \"It feels like I drank bleach or something.\"   Pt has a hx of diskectomy and reports increased back pain due to N/V  Pt is alert and oriented, speaking in full sentences, follows commands and responds appropriately to questions. NAD. Resp are even and unlabored.  Pt placed in lobby. Pt educated on triage process. Pt encouraged to alert staff for any changes.    "

## 2019-01-18 NOTE — ED NOTES
Pt left ED prior to discharge instructions being given. Unable to obtain last set of vital signs.

## 2019-01-18 NOTE — ED PROVIDER NOTES
"ED Provider Note    CHIEF COMPLAINT  Chief Complaint   Patient presents with   • Nausea/Vomiting/Diarrhea     Since aprox 2300 (1/17). Pt states \"I tried the seafood salad at [facility].\"       HPI  Jose Nunez is a 43 y.o. female who presents with sudden onset vomiting, diarrhea and abdominal cramping several hours after eating at seafood buffet [place] a local [place].  No bloody emesis or stool.  She denies similar complaints in the past.  Nausea is persistent although not as severe as it was.  Abdominal pain is diffuse, cramping and intermittent.  Symptoms are briefly resolved after bowel movement.  No chest pain, no shortness of breath.  Patient from the [place] area and is planning on meeting later today to return home.  Secondary to persistent vomiting, unable to keep down oral fluids, she presents for evaluation      REVIEW OF SYSTEMS  Constitutional: General malaise, no fever  Respiratory: No shortness of breath  Cardiac: No chest pain or syncope  Gastrointestinal: Vomiting, diarrhea, abdominal cramping  Musculoskeletal: No acute neck or back pain.  History of chronic back pain  Neurologic: No headache  Genitourinary: Denies dysuria       All other systems are negative.     PAST MEDICAL HISTORY  Past Medical History:   Diagnosis Date   • Bulging of lumbar intervertebral disc    • Uterine prolapse        FAMILY HISTORY  History reviewed. No pertinent family history.    SOCIAL HISTORY  Social History     Social History   • Marital status:      Spouse name: N/A   • Number of children: N/A   • Years of education: N/A     Social History Main Topics   • Smoking status: Current Every Day Smoker     Packs/day: 0.25     Types: Cigarettes   • Smokeless tobacco: Never Used   • Alcohol use Yes      Comment: occ   • Drug use: Yes     Types: Inhaled      Comment: Marijuana   • Sexual activity: Not on file     Other Topics Concern   • Not on file     Social History Narrative   • No narrative on file       SURGICAL " HISTORY  Past Surgical History:   Procedure Laterality Date   • LUMBAR LAMINECTOMY DISKECTOMY Right 10/17/2018    Procedure: LUMBAR LAMINECTOMY DISKECTOMY-MICRO DISC 4-5;  Surgeon: Jevon Cid M.D.;  Location: SURGERY [facility];  Service: Neurosurgery   • TUBAL LIGATION         CURRENT MEDICATIONS  Home Medications     Reviewed by Henna Castillo R.N. (Registered Nurse) on 01/18/19 at 0658  Med List Status: Not Addressed   Medication Last Dose Status   ibuprofen (MOTRIN) 800 MG Tab  Active   methocarbamol (ROBAXIN) 750 MG Tab  Active   senna-docusate (PERICOLACE OR SENOKOT S) 8.6-50 MG Tab  Active                ALLERGIES  No Known Allergies    PHYSICAL EXAM  VITAL SIGNS: /90   Pulse (!) 120   Temp 36.8 °C (98.2 °F) (Temporal)   Resp 20   Ht 1.524 m (5')   Wt 63.5 kg (139 lb 15.9 oz)   LMP 01/11/2019 (Approximate)   SpO2 98%   BMI 27.34 kg/m²   Constitutional: Well-nourished, nontoxic, no distress  ENT: Nares clear, mucous membranes dry.  Eyes:  Conjunctiva normal, No discharge.    Lymphatic: No adenopathy.   Cardiovascular: Tachycardic heart rate, Normal rhythm.   Pulmonary: No wheezing, no rales  Gastrointestinal: Abdomen is soft, mild epigastric tenderness without guarding.  Negative Mcghee sign.  No pain over McBurney's point  Skin: Warm, Dry, No jaundice.   Musculoskeletal:  No CVA tenderness.   Neurologic:  Normal motor and sensory function, No focal deficits noted.   Psychiatric:Normal affect, Normal mood.    RADIOLOGY/PROCEDURES/Labs  Results for orders placed or performed during the hospital encounter of 01/18/19   CBC WITH DIFFERENTIAL   Result Value Ref Range    WBC 9.9 4.8 - 10.8 K/uL    RBC 4.57 4.20 - 5.40 M/uL    Hemoglobin 14.6 12.0 - 16.0 g/dL    Hematocrit 42.8 37.0 - 47.0 %    MCV 93.7 81.4 - 97.8 fL    MCH 31.9 27.0 - 33.0 pg    MCHC 34.1 33.6 - 35.0 g/dL    RDW 44.8 35.9 - 50.0 fL    Platelet Count 415 164 - 446 K/uL    MPV 9.0 9.0 - 12.9 fL    Neutrophils-Polys 54.70  44.00 - 72.00 %    Lymphocytes 32.00 22.00 - 41.00 %    Monocytes 9.90 0.00 - 13.40 %    Eosinophils 2.50 0.00 - 6.90 %    Basophils 0.80 0.00 - 1.80 %    Immature Granulocytes 0.10 0.00 - 0.90 %    Nucleated RBC 0.00 /100 WBC    Neutrophils (Absolute) 5.41 2.00 - 7.15 K/uL    Lymphs (Absolute) 3.17 1.00 - 4.80 K/uL    Monos (Absolute) 0.98 (H) 0.00 - 0.85 K/uL    Eos (Absolute) 0.25 0.00 - 0.51 K/uL    Baso (Absolute) 0.08 0.00 - 0.12 K/uL    Immature Granulocytes (abs) 0.01 0.00 - 0.11 K/uL    NRBC (Absolute) 0.00 K/uL   COMP METABOLIC PANEL   Result Value Ref Range    Sodium 143 135 - 145 mmol/L    Potassium 4.1 3.6 - 5.5 mmol/L    Chloride 108 96 - 112 mmol/L    Co2 24 20 - 33 mmol/L    Anion Gap 11.0 0.0 - 11.9    Glucose 102 (H) 65 - 99 mg/dL    Bun 7 (L) 8 - 22 mg/dL    Creatinine 0.69 0.50 - 1.40 mg/dL    Calcium 9.5 8.5 - 10.5 mg/dL    AST(SGOT) 21 12 - 45 U/L    ALT(SGPT) 20 2 - 50 U/L    Alkaline Phosphatase 64 30 - 99 U/L    Total Bilirubin 0.5 0.1 - 1.5 mg/dL    Albumin 4.4 3.2 - 4.9 g/dL    Total Protein 7.5 6.0 - 8.2 g/dL    Globulin 3.1 1.9 - 3.5 g/dL    A-G Ratio 1.4 g/dL   LIPASE   Result Value Ref Range    Lipase 14 11 - 82 U/L   HCG QUAL SERUM   Result Value Ref Range    Beta-Hcg Qualitative Serum Negative Negative   URINALYSIS CULTURE, IF INDICATED   Result Value Ref Range    Color Yellow     Character Clear     Specific Gravity 1.007 <1.035    Ph 5.5 5.0 - 8.0    Glucose Negative Negative mg/dL    Ketones Negative Negative mg/dL    Protein Negative Negative mg/dL    Bilirubin Negative Negative    Urobilinogen, Urine 1.0 Negative    Nitrite Negative Negative    Leukocyte Esterase Negative Negative    Occult Blood Negative Negative    Micro Urine Req see below    ESTIMATED GFR   Result Value Ref Range    GFR If African American >60 >60 mL/min/1.73 m 2    GFR If Non African American >60 >60 mL/min/1.73 m 2         COURSE & MEDICAL DECISION MAKING  Pertinent Labs & Imaging studies reviewed. (See  chart for details)  Urinalysis negative, no evidence of pyelonephritis.  Liver function tests normal, no abnormal electrolytes.  CBC normal.  Secondary to the sudden onset of this constellation of symptoms, I suspect acute food poisoning.  After Zofran nausea resolved and was able to tolerate oral intake.  She was given a dose of GI cocktail which is improved her discomfort.  Reexam of the abdomen remains soft minimal epigastric tenderness.  This patient is stable for discharge.HYDRATION: Based on the patient's presentation of Acute Diarrhea, Acute Vomiting and Dehydration the patient was given IV fluids. IV Hydration was used because oral hydration failed due to Vomiting. Upon recheck following hydration, the patient was Improved, heart rate much better.   Plan for Zofran for nausea as needed, she is advised to see  for recheck within 1 day if no improvement.      FINAL IMPRESSION  1. Food poisoning, accidental or unintentional, initial encounter    2. Vomiting and diarrhea    3. Generalized abdominal pain            Electronically signed by: Regan Solomon, 1/18/2019 9:51 AM

## (undated) DEVICE — MASK ANESTHESIA ADULT  - (100/CA)

## (undated) DEVICE — LACTATED RINGERS INJ. 500 ML - (24EA/CA)

## (undated) DEVICE — ELECTRODE DUAL RETURN W/ CORD - (50/PK)

## (undated) DEVICE — TOOL DISSECT MATCH HEAD

## (undated) DEVICE — GOWN WARMING STANDARD FLEX - (30/CA)

## (undated) DEVICE — PROTECTOR ULNA NERVE - (36PR/CA)

## (undated) DEVICE — ELECTRODE 850 FOAM ADHESIVE - HYDROGEL RADIOTRNSPRNT (50/PK)

## (undated) DEVICE — TOWELS CLOTH SURGICAL - (4/PK 20PK/CA)

## (undated) DEVICE — SPONGE GAUZESTER 4 X 4 4PLY - (128PK/CA)

## (undated) DEVICE — APPLICATOR COTTON TIP 6 IN - STERILE (2EA/PK 100PK/BX)

## (undated) DEVICE — SUTURE GENERAL

## (undated) DEVICE — GOWN SURGEONS LARGE - (32/CA)

## (undated) DEVICE — PATTIES SURG X-RAYCOTTONOID - 1/2 X 3 IN (200/CA)

## (undated) DEVICE — CANISTER SUCTION 3000ML MECHANICAL FILTER AUTO SHUTOFF MEDI-VAC NONSTERILE LF DISP  (40EA/CA)

## (undated) DEVICE — SLEEVE, VASO, THIGH, MED

## (undated) DEVICE — GLOVE BIOGEL PI INDICATOR SZ 6.5 SURGICAL PF LF - (50/BX 4BX/CA)

## (undated) DEVICE — NEEDLE NON SAFETY HYPO 22 GA X 1 1/2 IN (100/BX)

## (undated) DEVICE — KIT SURGIFLO W/OUT THROMBIN - (6EA/CA)

## (undated) DEVICE — SYRINGE SAFETY 10 ML 18 GA X 1 1/2 BLUNT LL (100/BX 4BX/CA)

## (undated) DEVICE — ARMREST CRADLE FOAM - (2PR/PK 12PR/CA)

## (undated) DEVICE — TUBING CLEARLINK DUO-VENT - C-FLO (48EA/CA)

## (undated) DEVICE — STERI STRIP COMPOUND BENZOIN - TINCTURE 0.6ML WITH APPLICATOR (40EA/BX)

## (undated) DEVICE — SUTURE 2-0 VICRYL PLUS CT-1 - 8 X 18 INCH(12/BX)

## (undated) DEVICE — SURGIFOAM (12X7) - (12EA/CA)

## (undated) DEVICE — SENSOR SPO2 NEO LNCS ADHESIVE (20/BX) SEE USER NOTES

## (undated) DEVICE — NEEDLE SPINAL NON-SAFETY 18 GA X 3 IN (25EA/BX)

## (undated) DEVICE — KIT EVACUATER 3 SPRING PVC LF 1/8 DRAIN SIZE (10EA/CA)"

## (undated) DEVICE — BOVIE BLADE COATED &INSULATED - 25/PK

## (undated) DEVICE — DRAPE LARGE 3 QUARTER - (20/CA)

## (undated) DEVICE — TUBING C&T SET FLYING LEADS DRAIN TUBING (10EA/BX)

## (undated) DEVICE — COVER MAYO STAND X-LG - (22EA/CA)

## (undated) DEVICE — GLOVE BIOGEL INDICATOR SZ 6.5 SURGICAL PF LTX - (50PR/BX 4BX/CA)

## (undated) DEVICE — SET LEADWIRE 5 LEAD BEDSIDE DISPOSABLE ECG (1SET OF 5/EA)

## (undated) DEVICE — SYRINGE SAFETY 3 ML 18 GA X 1 1/2 BLUNT LL (100/BX 8BX/CA)

## (undated) DEVICE — DRAPE STRLE REG TOWEL 18X24 - (10/BX 4BX/CA)"

## (undated) DEVICE — DRAPE LAPAROTOMY T SHEET - (12EA/CA)

## (undated) DEVICE — MIDAS LUBRICATOR DIFFUSER PACK (4EA/CA)

## (undated) DEVICE — CLOSURE SKIN STRIP 1/2 X 4 IN - (STERI STRIP) (50/BX 4BX/CA)

## (undated) DEVICE — LACTATED RINGERS INJ 1000 ML - (14EA/CA 60CA/PF)

## (undated) DEVICE — PACK NEURO - (2EA/CA)

## (undated) DEVICE — HEADREST PRONEVIEW LARGE - (10/CA)

## (undated) DEVICE — BOVIE  BLADE 6 EXTENDED - (50/PK)

## (undated) DEVICE — GLOVE BIOGEL SZ 8 SURGICAL PF LTX - (50PR/BX 4BX/CA)

## (undated) DEVICE — NEPTUNE 4 PORT MANIFOLD - (20/PK)

## (undated) DEVICE — SODIUM CHL IRRIGATION 0.9% 1000ML (12EA/CA)

## (undated) DEVICE — CHLORAPREP 26 ML APPLICATOR - ORANGE TINT(25/CA)

## (undated) DEVICE — SET EXTENSION WITH 2 PORTS (48EA/CA) ***PART #2C8610 IS A SUBSTITUTE*****

## (undated) DEVICE — HEMOSTAT SURG ABSORBABLE - 2 X 3 IN SURGICEL (24EA/CA)

## (undated) DEVICE — GLOVE BIOGEL PI ORTHO SZ 8 PF LF (40PR/BX)

## (undated) DEVICE — PACK JACKSON TABLE KIT W/OUT - HR (6EA/CA)

## (undated) DEVICE — SUCTION INSTRUMENT YANKAUER BULBOUS TIP W/O VENT (50EA/CA)

## (undated) DEVICE — GLOVE SZ 6.5 BIOGEL PI MICRO - PF LF (50PR/BX)

## (undated) DEVICE — KIT ANESTHESIA W/CIRCUIT & 3/LT BAG W/FILTER (20EA/CA)

## (undated) DEVICE — KIT ROOM DECONTAMINATION